# Patient Record
Sex: MALE | Race: WHITE | NOT HISPANIC OR LATINO | Employment: OTHER | URBAN - METROPOLITAN AREA
[De-identification: names, ages, dates, MRNs, and addresses within clinical notes are randomized per-mention and may not be internally consistent; named-entity substitution may affect disease eponyms.]

---

## 2022-08-25 ENCOUNTER — HOSPITAL ENCOUNTER (OUTPATIENT)
Facility: HOSPITAL | Age: 77
Discharge: HOME | End: 2022-08-26
Attending: EMERGENCY MEDICINE | Admitting: INTERNAL MEDICINE
Payer: MEDICARE

## 2022-08-25 DIAGNOSIS — R07.9 CHEST PAIN, UNSPECIFIED TYPE: ICD-10-CM

## 2022-08-25 DIAGNOSIS — R79.89 ELEVATED TROPONIN: ICD-10-CM

## 2022-08-25 DIAGNOSIS — R07.9 CHEST PAIN: Primary | ICD-10-CM

## 2022-08-25 PROBLEM — R73.03 PREDIABETES: Status: ACTIVE | Noted: 2022-08-25

## 2022-08-25 PROBLEM — I21.4 NSTEMI (NON-ST ELEVATED MYOCARDIAL INFARCTION): Status: ACTIVE | Noted: 2022-08-25

## 2022-08-25 LAB
ALBUMIN SERPL BCP-MCNC: 3.1 G/DL (ref 3.5–5.2)
ALP SERPL-CCNC: 54 U/L (ref 55–135)
ALT SERPL W/O P-5'-P-CCNC: 38 U/L (ref 10–44)
ANION GAP SERPL CALC-SCNC: 9 MMOL/L (ref 8–16)
AORTIC ROOT ANNULUS: 3.51 CM
APTT BLDCRRT: 29.5 SEC (ref 21–32)
ASCENDING AORTA: 2.88 CM
AST SERPL-CCNC: 36 U/L (ref 10–40)
AV INDEX (PROSTH): 0.71
AV MEAN GRADIENT: 9 MMHG
AV PEAK GRADIENT: 13 MMHG
AV REGURGITATION PRESSURE HALF TIME: 991.4 MS
AV VALVE AREA: 2.28 CM2
AV VELOCITY RATIO: 0.71
BACTERIA #/AREA URNS HPF: NORMAL /HPF
BASOPHILS # BLD AUTO: 0.02 K/UL (ref 0–0.2)
BASOPHILS # BLD AUTO: 0.02 K/UL (ref 0–0.2)
BASOPHILS NFR BLD: 0.3 % (ref 0–1.9)
BASOPHILS NFR BLD: 0.3 % (ref 0–1.9)
BILIRUB SERPL-MCNC: 1.4 MG/DL (ref 0.1–1)
BILIRUB UR QL STRIP: NEGATIVE
BNP SERPL-MCNC: 107 PG/ML (ref 0–99)
BSA FOR ECHO PROCEDURE: 2.22 M2
BUN SERPL-MCNC: 21 MG/DL (ref 8–23)
CALCIUM SERPL-MCNC: 9.5 MG/DL (ref 8.7–10.5)
CATH EF ESTIMATED: 60 %
CHLORIDE SERPL-SCNC: 99 MMOL/L (ref 95–110)
CK SERPL-CCNC: 205 U/L (ref 20–200)
CLARITY UR: CLEAR
CO2 SERPL-SCNC: 25 MMOL/L (ref 23–29)
COLOR UR: YELLOW
CREAT SERPL-MCNC: 1.2 MG/DL (ref 0.5–1.4)
CTP QC/QA: YES
CV ECHO LV RWT: 0.62 CM
DIFFERENTIAL METHOD: ABNORMAL
DIFFERENTIAL METHOD: ABNORMAL
DOP CALC AO PEAK VEL: 1.78 M/S
DOP CALC AO VTI: 36.7 CM
DOP CALC LVOT AREA: 3.2 CM2
DOP CALC LVOT DIAMETER: 2.03 CM
DOP CALC LVOT PEAK VEL: 1.26 M/S
DOP CALC LVOT STROKE VOLUME: 83.78 CM3
DOP CALC RVOT PEAK VEL: 0.74 M/S
DOP CALC RVOT VTI: 15 CM
DOP CALCLVOT PEAK VEL VTI: 25.9 CM
E WAVE DECELERATION TIME: 148.56 MSEC
E/A RATIO: 1.04
E/E' RATIO: 9.89 M/S
ECHO LV POSTERIOR WALL: 1.37 CM (ref 0.6–1.1)
EJECTION FRACTION: 55 %
EOSINOPHIL # BLD AUTO: 0 K/UL (ref 0–0.5)
EOSINOPHIL # BLD AUTO: 0 K/UL (ref 0–0.5)
EOSINOPHIL NFR BLD: 0.1 % (ref 0–8)
EOSINOPHIL NFR BLD: 0.3 % (ref 0–8)
ERYTHROCYTE [DISTWIDTH] IN BLOOD BY AUTOMATED COUNT: 12.4 % (ref 11.5–14.5)
ERYTHROCYTE [DISTWIDTH] IN BLOOD BY AUTOMATED COUNT: 12.5 % (ref 11.5–14.5)
EST. GFR  (NO RACE VARIABLE): >60 ML/MIN/1.73 M^2
FRACTIONAL SHORTENING: 29 % (ref 28–44)
GLUCOSE SERPL-MCNC: 179 MG/DL (ref 70–110)
GLUCOSE UR QL STRIP: ABNORMAL
HCT VFR BLD AUTO: 38.7 % (ref 40–54)
HCT VFR BLD AUTO: 40.2 % (ref 40–54)
HGB BLD-MCNC: 13.7 G/DL (ref 14–18)
HGB BLD-MCNC: 14.2 G/DL (ref 14–18)
HGB UR QL STRIP: ABNORMAL
HYALINE CASTS #/AREA URNS LPF: 0 /LPF
IMM GRANULOCYTES # BLD AUTO: 0.04 K/UL (ref 0–0.04)
IMM GRANULOCYTES # BLD AUTO: 0.05 K/UL (ref 0–0.04)
IMM GRANULOCYTES NFR BLD AUTO: 0.5 % (ref 0–0.5)
IMM GRANULOCYTES NFR BLD AUTO: 0.6 % (ref 0–0.5)
INR PPP: 1.1 (ref 0.8–1.2)
INTERVENTRICULAR SEPTUM: 1.29 CM (ref 0.6–1.1)
IVC DIAMETER: 1.77 CM
IVRT: 60.89 MSEC
KETONES UR QL STRIP: NEGATIVE
LA MAJOR: 4.93 CM
LA MINOR: 5.09 CM
LA WIDTH: 3.8 CM
LEFT ATRIUM SIZE: 3.97 CM
LEFT ATRIUM VOLUME INDEX: 29.3 ML/M2
LEFT ATRIUM VOLUME: 64.23 CM3
LEFT INTERNAL DIMENSION IN SYSTOLE: 3.14 CM (ref 2.1–4)
LEFT VENTRICLE DIASTOLIC VOLUME INDEX: 40.76 ML/M2
LEFT VENTRICLE DIASTOLIC VOLUME: 89.27 ML
LEFT VENTRICLE MASS INDEX: 103 G/M2
LEFT VENTRICLE SYSTOLIC VOLUME INDEX: 17.9 ML/M2
LEFT VENTRICLE SYSTOLIC VOLUME: 39.25 ML
LEFT VENTRICULAR INTERNAL DIMENSION IN DIASTOLE: 4.43 CM (ref 3.5–6)
LEFT VENTRICULAR MASS: 224.79 G
LEUKOCYTE ESTERASE UR QL STRIP: NEGATIVE
LV LATERAL E/E' RATIO: 11.75 M/S
LV SEPTAL E/E' RATIO: 8.55 M/S
LVOT MG: 3.33 MMHG
LVOT MV: 0.86 CM/S
LYMPHOCYTES # BLD AUTO: 0.5 K/UL (ref 1–4.8)
LYMPHOCYTES # BLD AUTO: 0.7 K/UL (ref 1–4.8)
LYMPHOCYTES NFR BLD: 6.1 % (ref 18–48)
LYMPHOCYTES NFR BLD: 9.2 % (ref 18–48)
MCH RBC QN AUTO: 31.6 PG (ref 27–31)
MCH RBC QN AUTO: 31.8 PG (ref 27–31)
MCHC RBC AUTO-ENTMCNC: 35.3 G/DL (ref 32–36)
MCHC RBC AUTO-ENTMCNC: 35.4 G/DL (ref 32–36)
MCV RBC AUTO: 89 FL (ref 82–98)
MCV RBC AUTO: 90 FL (ref 82–98)
MICROSCOPIC COMMENT: NORMAL
MONOCYTES # BLD AUTO: 0.8 K/UL (ref 0.3–1)
MONOCYTES # BLD AUTO: 0.9 K/UL (ref 0.3–1)
MONOCYTES NFR BLD: 10.8 % (ref 4–15)
MONOCYTES NFR BLD: 12.1 % (ref 4–15)
MV PEAK A VEL: 0.9 M/S
MV PEAK E VEL: 0.94 M/S
NEUTROPHILS # BLD AUTO: 6 K/UL (ref 1.8–7.7)
NEUTROPHILS # BLD AUTO: 6.1 K/UL (ref 1.8–7.7)
NEUTROPHILS NFR BLD: 78.8 % (ref 38–73)
NEUTROPHILS NFR BLD: 80.9 % (ref 38–73)
NITRITE UR QL STRIP: NEGATIVE
NRBC BLD-RTO: 0 /100 WBC
NRBC BLD-RTO: 0 /100 WBC
PH UR STRIP: 6 [PH] (ref 5–8)
PISA AR MAX VEL: 2.84 M/S
PISA MRMAX VEL: 3.06 M/S
PLATELET # BLD AUTO: 121 K/UL (ref 150–450)
PLATELET # BLD AUTO: 127 K/UL (ref 150–450)
PMV BLD AUTO: 8.8 FL (ref 9.2–12.9)
PMV BLD AUTO: 9.8 FL (ref 9.2–12.9)
POCT GLUCOSE: 267 MG/DL (ref 70–110)
POTASSIUM SERPL-SCNC: 4.2 MMOL/L (ref 3.5–5.1)
PROT SERPL-MCNC: 7 G/DL (ref 6–8.4)
PROT UR QL STRIP: ABNORMAL
PROTHROMBIN TIME: 12 SEC (ref 9–12.5)
PV MEAN GRADIENT: 1.51 MMHG
RA MAJOR: 3.85 CM
RA PRESSURE: 3 MMHG
RA WIDTH: 2.8 CM
RBC # BLD AUTO: 4.34 M/UL (ref 4.6–6.2)
RBC # BLD AUTO: 4.47 M/UL (ref 4.6–6.2)
RBC #/AREA URNS HPF: 1 /HPF (ref 0–4)
SARS-COV-2 RDRP RESP QL NAA+PROBE: NEGATIVE
SODIUM SERPL-SCNC: 133 MMOL/L (ref 136–145)
SP GR UR STRIP: 1.01 (ref 1–1.03)
STJ: 2.9 CM
TDI LATERAL: 0.08 M/S
TDI SEPTAL: 0.11 M/S
TDI: 0.1 M/S
TRICUSPID ANNULAR PLANE SYSTOLIC EXCURSION: 2.1 CM
TROPONIN I SERPL DL<=0.01 NG/ML-MCNC: 0.06 NG/ML (ref 0–0.03)
TROPONIN I SERPL DL<=0.01 NG/ML-MCNC: 0.07 NG/ML (ref 0–0.03)
URN SPEC COLLECT METH UR: ABNORMAL
UROBILINOGEN UR STRIP-ACNC: NEGATIVE EU/DL
WBC # BLD AUTO: 7.37 K/UL (ref 3.9–12.7)
WBC # BLD AUTO: 7.79 K/UL (ref 3.9–12.7)
WBC #/AREA URNS HPF: 0 /HPF (ref 0–5)
YEAST URNS QL MICRO: NORMAL

## 2022-08-25 PROCEDURE — 63600175 PHARM REV CODE 636 W HCPCS: Performed by: INTERNAL MEDICINE

## 2022-08-25 PROCEDURE — 99203 OFFICE O/P NEW LOW 30 MIN: CPT | Mod: 25,,, | Performed by: INTERNAL MEDICINE

## 2022-08-25 PROCEDURE — 81000 URINALYSIS NONAUTO W/SCOPE: CPT | Performed by: EMERGENCY MEDICINE

## 2022-08-25 PROCEDURE — 99152 MOD SED SAME PHYS/QHP 5/>YRS: CPT | Mod: ,,, | Performed by: INTERNAL MEDICINE

## 2022-08-25 PROCEDURE — C1769 GUIDE WIRE: HCPCS | Performed by: INTERNAL MEDICINE

## 2022-08-25 PROCEDURE — 99152 PR MOD CONSCIOUS SEDATION, SAME PHYS, 5+ YRS, FIRST 15 MIN: ICD-10-PCS | Mod: ,,, | Performed by: INTERNAL MEDICINE

## 2022-08-25 PROCEDURE — 85025 COMPLETE CBC W/AUTO DIFF WBC: CPT | Performed by: INTERNAL MEDICINE

## 2022-08-25 PROCEDURE — 80053 COMPREHEN METABOLIC PANEL: CPT | Performed by: EMERGENCY MEDICINE

## 2022-08-25 PROCEDURE — 25000003 PHARM REV CODE 250: Performed by: INTERNAL MEDICINE

## 2022-08-25 PROCEDURE — C1894 INTRO/SHEATH, NON-LASER: HCPCS | Performed by: INTERNAL MEDICINE

## 2022-08-25 PROCEDURE — G0378 HOSPITAL OBSERVATION PER HR: HCPCS

## 2022-08-25 PROCEDURE — 99152 MOD SED SAME PHYS/QHP 5/>YRS: CPT | Performed by: INTERNAL MEDICINE

## 2022-08-25 PROCEDURE — 25500020 PHARM REV CODE 255: Performed by: INTERNAL MEDICINE

## 2022-08-25 PROCEDURE — 93010 EKG 12-LEAD: ICD-10-PCS | Mod: ,,, | Performed by: INTERNAL MEDICINE

## 2022-08-25 PROCEDURE — 96365 THER/PROPH/DIAG IV INF INIT: CPT | Mod: 59

## 2022-08-25 PROCEDURE — 27201423 OPTIME MED/SURG SUP & DEVICES STERILE SUPPLY: Performed by: INTERNAL MEDICINE

## 2022-08-25 PROCEDURE — 99285 EMERGENCY DEPT VISIT HI MDM: CPT | Mod: 25

## 2022-08-25 PROCEDURE — 82550 ASSAY OF CK (CPK): CPT | Performed by: EMERGENCY MEDICINE

## 2022-08-25 PROCEDURE — 99203 PR OFFICE/OUTPT VISIT, NEW, LEVL III, 30-44 MIN: ICD-10-PCS | Mod: 25,,, | Performed by: INTERNAL MEDICINE

## 2022-08-25 PROCEDURE — 85730 THROMBOPLASTIN TIME PARTIAL: CPT | Performed by: INTERNAL MEDICINE

## 2022-08-25 PROCEDURE — 93458 L HRT ARTERY/VENTRICLE ANGIO: CPT | Performed by: INTERNAL MEDICINE

## 2022-08-25 PROCEDURE — 96366 THER/PROPH/DIAG IV INF ADDON: CPT | Mod: 59

## 2022-08-25 PROCEDURE — 83880 ASSAY OF NATRIURETIC PEPTIDE: CPT | Performed by: EMERGENCY MEDICINE

## 2022-08-25 PROCEDURE — 96375 TX/PRO/DX INJ NEW DRUG ADDON: CPT | Mod: 59

## 2022-08-25 PROCEDURE — 93458 PR CATH PLACE/CORON ANGIO, IMG SUPER/INTERP,W LEFT HEART VENTRICULOGRAPHY: ICD-10-PCS | Mod: 26,,, | Performed by: INTERNAL MEDICINE

## 2022-08-25 PROCEDURE — 84484 ASSAY OF TROPONIN QUANT: CPT | Performed by: INTERNAL MEDICINE

## 2022-08-25 PROCEDURE — 85025 COMPLETE CBC W/AUTO DIFF WBC: CPT | Mod: 91 | Performed by: EMERGENCY MEDICINE

## 2022-08-25 PROCEDURE — 93010 ELECTROCARDIOGRAM REPORT: CPT | Mod: ,,, | Performed by: INTERNAL MEDICINE

## 2022-08-25 PROCEDURE — 84484 ASSAY OF TROPONIN QUANT: CPT | Mod: 91 | Performed by: EMERGENCY MEDICINE

## 2022-08-25 PROCEDURE — 99153 MOD SED SAME PHYS/QHP EA: CPT | Performed by: INTERNAL MEDICINE

## 2022-08-25 PROCEDURE — 93458 L HRT ARTERY/VENTRICLE ANGIO: CPT | Mod: 26,,, | Performed by: INTERNAL MEDICINE

## 2022-08-25 PROCEDURE — 93005 ELECTROCARDIOGRAM TRACING: CPT

## 2022-08-25 PROCEDURE — 85610 PROTHROMBIN TIME: CPT | Performed by: INTERNAL MEDICINE

## 2022-08-25 PROCEDURE — U0002 COVID-19 LAB TEST NON-CDC: HCPCS | Performed by: EMERGENCY MEDICINE

## 2022-08-25 PROCEDURE — C1887 CATHETER, GUIDING: HCPCS | Performed by: INTERNAL MEDICINE

## 2022-08-25 RX ORDER — KETOCONAZOLE 200 MG/1
TABLET ORAL
COMMUNITY
End: 2022-08-25 | Stop reason: CLARIF

## 2022-08-25 RX ORDER — IBUPROFEN 200 MG
24 TABLET ORAL
Status: DISCONTINUED | OUTPATIENT
Start: 2022-08-25 | End: 2022-08-25 | Stop reason: SDUPTHER

## 2022-08-25 RX ORDER — CLOTRIMAZOLE AND BETAMETHASONE DIPROPIONATE 10; .64 MG/G; MG/G
CREAM TOPICAL
COMMUNITY
Start: 2022-04-04 | End: 2022-08-25 | Stop reason: CLARIF

## 2022-08-25 RX ORDER — IBUPROFEN 200 MG
24 TABLET ORAL
Status: DISCONTINUED | OUTPATIENT
Start: 2022-08-25 | End: 2022-08-26 | Stop reason: HOSPADM

## 2022-08-25 RX ORDER — IBUPROFEN 200 MG
16 TABLET ORAL
Status: DISCONTINUED | OUTPATIENT
Start: 2022-08-25 | End: 2022-08-25 | Stop reason: SDUPTHER

## 2022-08-25 RX ORDER — ATORVASTATIN CALCIUM 40 MG/1
40 TABLET, FILM COATED ORAL NIGHTLY
Status: DISCONTINUED | OUTPATIENT
Start: 2022-08-25 | End: 2022-08-26 | Stop reason: HOSPADM

## 2022-08-25 RX ORDER — IODIXANOL 320 MG/ML
INJECTION, SOLUTION INTRAVASCULAR
Status: DISCONTINUED | OUTPATIENT
Start: 2022-08-25 | End: 2022-08-25

## 2022-08-25 RX ORDER — DIPHENHYDRAMINE HYDROCHLORIDE 50 MG/ML
25 INJECTION INTRAMUSCULAR; INTRAVENOUS ONCE
Status: COMPLETED | OUTPATIENT
Start: 2022-08-25 | End: 2022-08-25

## 2022-08-25 RX ORDER — SODIUM CHLORIDE 9 MG/ML
INJECTION, SOLUTION INTRAVENOUS CONTINUOUS
Status: DISCONTINUED | OUTPATIENT
Start: 2022-08-25 | End: 2022-08-26 | Stop reason: HOSPADM

## 2022-08-25 RX ORDER — FENTANYL CITRATE 50 UG/ML
INJECTION, SOLUTION INTRAMUSCULAR; INTRAVENOUS
Status: DISCONTINUED | OUTPATIENT
Start: 2022-08-25 | End: 2022-08-25

## 2022-08-25 RX ORDER — GABAPENTIN 300 MG/1
CAPSULE ORAL
COMMUNITY
End: 2022-08-25 | Stop reason: CLARIF

## 2022-08-25 RX ORDER — MIDAZOLAM HYDROCHLORIDE 1 MG/ML
INJECTION, SOLUTION INTRAMUSCULAR; INTRAVENOUS
Status: DISCONTINUED | OUTPATIENT
Start: 2022-08-25 | End: 2022-08-25

## 2022-08-25 RX ORDER — ENOXAPARIN SODIUM 100 MG/ML
40 INJECTION SUBCUTANEOUS EVERY 24 HOURS
Status: DISCONTINUED | OUTPATIENT
Start: 2022-08-25 | End: 2022-08-25

## 2022-08-25 RX ORDER — INDOMETHACIN 50 MG/1
CAPSULE ORAL
COMMUNITY
End: 2022-08-25 | Stop reason: CLARIF

## 2022-08-25 RX ORDER — HEPARIN SODIUM 1000 [USP'U]/ML
INJECTION, SOLUTION INTRAVENOUS; SUBCUTANEOUS
Status: DISCONTINUED | OUTPATIENT
Start: 2022-08-25 | End: 2022-08-25

## 2022-08-25 RX ORDER — ACETAMINOPHEN 325 MG/1
650 TABLET ORAL EVERY 4 HOURS PRN
Status: DISCONTINUED | OUTPATIENT
Start: 2022-08-25 | End: 2022-08-26 | Stop reason: HOSPADM

## 2022-08-25 RX ORDER — METFORMIN HYDROCHLORIDE 500 MG/1
500 TABLET ORAL 2 TIMES DAILY
Status: ON HOLD | COMMUNITY
Start: 2022-07-20 | End: 2022-08-26 | Stop reason: SDUPTHER

## 2022-08-25 RX ORDER — ONDANSETRON 8 MG/1
8 TABLET, ORALLY DISINTEGRATING ORAL EVERY 8 HOURS PRN
Status: DISCONTINUED | OUTPATIENT
Start: 2022-08-25 | End: 2022-08-26 | Stop reason: HOSPADM

## 2022-08-25 RX ORDER — GLUCAGON 1 MG
1 KIT INJECTION
Status: DISCONTINUED | OUTPATIENT
Start: 2022-08-25 | End: 2022-08-25 | Stop reason: SDUPTHER

## 2022-08-25 RX ORDER — INSULIN ASPART 100 [IU]/ML
0-5 INJECTION, SOLUTION INTRAVENOUS; SUBCUTANEOUS
Status: DISCONTINUED | OUTPATIENT
Start: 2022-08-25 | End: 2022-08-26 | Stop reason: HOSPADM

## 2022-08-25 RX ORDER — FAMOTIDINE 20 MG/1
20 TABLET, FILM COATED ORAL 2 TIMES DAILY
Status: COMPLETED | OUTPATIENT
Start: 2022-08-25 | End: 2022-08-25

## 2022-08-25 RX ORDER — NITROGLYCERIN 80 MG/1
1 PATCH TRANSDERMAL DAILY
Status: DISCONTINUED | OUTPATIENT
Start: 2022-08-25 | End: 2022-08-26 | Stop reason: HOSPADM

## 2022-08-25 RX ORDER — ONDANSETRON 4 MG/1
8 TABLET, ORALLY DISINTEGRATING ORAL EVERY 8 HOURS PRN
COMMUNITY
Start: 2022-08-24

## 2022-08-25 RX ORDER — SODIUM CHLORIDE 0.9 % (FLUSH) 0.9 %
10 SYRINGE (ML) INJECTION EVERY 12 HOURS PRN
Status: DISCONTINUED | OUTPATIENT
Start: 2022-08-25 | End: 2022-08-26 | Stop reason: HOSPADM

## 2022-08-25 RX ORDER — NALOXONE HCL 0.4 MG/ML
0.02 VIAL (ML) INJECTION
Status: DISCONTINUED | OUTPATIENT
Start: 2022-08-25 | End: 2022-08-26 | Stop reason: HOSPADM

## 2022-08-25 RX ORDER — GLUCAGON 1 MG
1 KIT INJECTION
Status: DISCONTINUED | OUTPATIENT
Start: 2022-08-25 | End: 2022-08-26 | Stop reason: HOSPADM

## 2022-08-25 RX ORDER — METOPROLOL SUCCINATE 25 MG/1
25 TABLET, EXTENDED RELEASE ORAL DAILY
Status: DISCONTINUED | OUTPATIENT
Start: 2022-08-26 | End: 2022-08-26 | Stop reason: HOSPADM

## 2022-08-25 RX ORDER — ATORVASTATIN CALCIUM 20 MG/1
TABLET, FILM COATED ORAL
COMMUNITY
End: 2022-08-25 | Stop reason: CLARIF

## 2022-08-25 RX ORDER — HEPARIN SODIUM,PORCINE/D5W 25000/250
0-40 INTRAVENOUS SOLUTION INTRAVENOUS CONTINUOUS
Status: DISCONTINUED | OUTPATIENT
Start: 2022-08-25 | End: 2022-08-25

## 2022-08-25 RX ORDER — NAPROXEN SODIUM 220 MG/1
81 TABLET, FILM COATED ORAL DAILY
Status: DISCONTINUED | OUTPATIENT
Start: 2022-08-25 | End: 2022-08-26 | Stop reason: HOSPADM

## 2022-08-25 RX ORDER — LIDOCAINE HYDROCHLORIDE 20 MG/ML
INJECTION, SOLUTION EPIDURAL; INFILTRATION; INTRACAUDAL; PERINEURAL
Status: DISCONTINUED | OUTPATIENT
Start: 2022-08-25 | End: 2022-08-25

## 2022-08-25 RX ORDER — MELOXICAM 7.5 MG/1
TABLET ORAL
COMMUNITY
End: 2022-08-25 | Stop reason: CLARIF

## 2022-08-25 RX ORDER — NITROGLYCERIN 5 MG/ML
INJECTION, SOLUTION INTRAVENOUS
Status: DISCONTINUED | OUTPATIENT
Start: 2022-08-25 | End: 2022-08-25

## 2022-08-25 RX ORDER — IBUPROFEN 200 MG
16 TABLET ORAL
Status: DISCONTINUED | OUTPATIENT
Start: 2022-08-25 | End: 2022-08-26 | Stop reason: HOSPADM

## 2022-08-25 RX ORDER — SODIUM CHLORIDE 9 MG/ML
INJECTION, SOLUTION INTRAVENOUS CONTINUOUS
Status: ACTIVE | OUTPATIENT
Start: 2022-08-25 | End: 2022-08-25

## 2022-08-25 RX ORDER — POLYETHYLENE GLYCOL 3350 17 G/17G
17 POWDER, FOR SOLUTION ORAL DAILY
Status: DISCONTINUED | OUTPATIENT
Start: 2022-08-25 | End: 2022-08-26 | Stop reason: HOSPADM

## 2022-08-25 RX ADMIN — NITROGLYCERIN 1 PATCH: 0.4 PATCH TRANSDERMAL at 01:08

## 2022-08-25 RX ADMIN — ATORVASTATIN CALCIUM 40 MG: 40 TABLET, FILM COATED ORAL at 09:08

## 2022-08-25 RX ADMIN — FAMOTIDINE 20 MG: 20 TABLET ORAL at 12:08

## 2022-08-25 RX ADMIN — METHYLPREDNISOLONE SODIUM SUCCINATE 80 MG: 40 INJECTION, POWDER, FOR SOLUTION INTRAMUSCULAR; INTRAVENOUS at 12:08

## 2022-08-25 RX ADMIN — SODIUM CHLORIDE: 9 INJECTION, SOLUTION INTRAVENOUS at 09:08

## 2022-08-25 RX ADMIN — HEPARIN SODIUM 12 UNITS/KG/HR: 10000 INJECTION, SOLUTION INTRAVENOUS at 01:08

## 2022-08-25 RX ADMIN — DIPHENHYDRAMINE HYDROCHLORIDE 25 MG: 50 INJECTION, SOLUTION INTRAMUSCULAR; INTRAVENOUS at 12:08

## 2022-08-25 RX ADMIN — ASPIRIN 81 MG CHEWABLE TABLET 81 MG: 81 TABLET CHEWABLE at 11:08

## 2022-08-25 NOTE — INTERVAL H&P NOTE
The patient has been examined and the H&P has been reviewed:    I concur with the findings and no changes have occurred since H&P was written.    Anesthesia/Surgery risks, benefits and alternative options discussed and understood by patient/family.          Active Hospital Problems    Diagnosis  POA    NSTEMI (non-ST elevated myocardial infarction) [I21.4]  Unknown    Prediabetes [R73.03]  Unknown      Resolved Hospital Problems   No resolved problems to display.

## 2022-08-25 NOTE — BRIEF OP NOTE
[facility] - [facility] Lab (Hospital)  Brief Operative Note  Cardiology    SUMMARY     Surgery Date: 8/25/2022     Surgeon(s) and Role:     * Jacky Menard MD - Primary    Assisting Surgeon: None    Pre-op Diagnosis:  Chest pain, unspecified type [R07.9]    Post-op Diagnosis: Post-Op Diagnosis Codes:     * Chest pain, unspecified type [R07.9]    Procedure Performed:     Procedure(s) (LRB):  CATHETERIZATION, HEART, LEFT (Left)    Technical Procedures Used:   Codominant   Left main large,   LAD mild diffuse, moderate size normal D1   Left circ is large, OM1 moderate size, OM2 gives a lateral branch that itself gives another branch with ostial 80% stenosis small vessel however , LPL moderate   RCA has early take off of RV marginal , RCA is moderate , with patent PDA     lvedp normal , no gradient   lvef 55%   Dc heparin   cw asa , statin, add bb       Estimated Blood Loss: * No values recorded between 8/25/2022  3:51 PM and 8/25/2022  4:26 PM *         Specimens:   Specimen (24h ago, onward)            None

## 2022-08-25 NOTE — Clinical Note
The catheter was repositioned into the ostium   left main  ostium   right coronary artery. Hemodynamics were performed.  An angiography was performed of the left coronary arteries and right coronary arteries. Multiple views were taken.

## 2022-08-25 NOTE — H&P (VIEW-ONLY)
[facility] - Emergency Dept.  Cardiology  Consult Note    Patient Name: Shahram London  MRN: [id number]  Admission Date: 8/25/2022  Hospital Length of Stay: 0 days  Code Status: Full Code   Attending Provider: Eren Stapleton MD   Consulting Provider: Jacky Menard MD  Primary Care Physician: Jimmy Montelongo MD  Principal Problem:<principal problem not specified>    Patient information was obtained from patient, past medical records and ER records.     Inpatient consult to Cardiology  Consult performed by: Jacky Menard MD  Consult ordered by: Amy Butt MD  Reason for consult: NSTEMI        Subjective:     Chief Complaint:  NSTEMI     HPI:   76 yo male with h/o prediatbetes, htn, hld, ex smoker for very short time ?   Comes in started to have chills but no fever 4 days ago   Then started to have chest pain radiating to the back today, trop mildly elevated, still having mid sternal chest pain   His ekg no LUDIN or depression   Echo prelim normal EF , no AS   Very remote h/o allergy to contrast, but no anaphylaxis   Discussed lhc/pci      No past medical history on file.    No past surgical history on file.    Review of patient's allergies indicates:   Allergen Reactions    Iodinated contrast media Other (See Comments)     UNKNOWN       No current facility-administered medications on file prior to encounter.     Current Outpatient Medications on File Prior to Encounter   Medication Sig    metFORMIN (GLUCOPHAGE) 500 MG tablet Take 500 mg by mouth 2 (two) times daily.    ondansetron (ZOFRAN-ODT) 4 MG TbDL Take 8 mg by mouth every 8 (eight) hours as needed.    [DISCONTINUED] atorvastatin (LIPITOR) 20 MG tablet atorvastatin 20 mg tablet    [DISCONTINUED] clotrimazole-betamethasone 1-0.05% (LOTRISONE) cream SMARTSIG:Topical Morning-Evening    [DISCONTINUED] gabapentin (NEURONTIN) 300 MG capsule gabapentin 300 mg capsule    [DISCONTINUED] indomethacin (INDOCIN) 50 MG capsule indomethacin 50 mg  capsule    [DISCONTINUED] ketoconazole (NIZORAL) 200 mg Tab ketoconazole 200 mg tablet   Take 1 tablet every day by oral route.    [DISCONTINUED] meloxicam (MOBIC) 7.5 MG tablet meloxicam 7.5 mg tablet     Family History    None       Tobacco Use    Smoking status: Not on file    Smokeless tobacco: Not on file   Substance and Sexual Activity    Alcohol use: Not on file    Drug use: Not on file    Sexual activity: Not on file     Review of Systems   Constitutional: Negative for fever and malaise/fatigue.   HENT:  Negative for sore throat.    Eyes:  Negative for blurred vision.   Cardiovascular:  Positive for chest pain. Negative for claudication, cyanosis, dyspnea on exertion, irregular heartbeat, leg swelling, near-syncope, orthopnea, palpitations, paroxysmal nocturnal dyspnea and syncope.   Respiratory:  Negative for cough and hemoptysis.    Hematologic/Lymphatic: Negative for bleeding problem.   Skin:  Negative for rash.   Musculoskeletal:  Negative for falls.   Gastrointestinal:  Negative for abdominal pain.   Genitourinary: Negative.    Neurological: Negative.    Psychiatric/Behavioral:  Negative for altered mental status and substance abuse.    Objective:     Vital Signs (Most Recent):  Temp: 99.6 °F (37.6 °C) (08/25/22 1000)  Pulse: 73 (08/25/22 1000)  Resp: 20 (08/25/22 1000)  BP: (!) 142/76 (08/25/22 1000)  SpO2: 96 % (08/25/22 1000)   Vital Signs (24h Range):  Temp:  [98.1 °F (36.7 °C)-99.6 °F (37.6 °C)] 99.6 °F (37.6 °C)  Pulse:  [73-90] 73  Resp:  [18-20] 20  SpO2:  [95 %-97 %] 96 %  BP: (142-153)/(74-83) 142/76     Weight: 97.4 kg (214 lb 11.7 oz)  Body mass index is 29.12 kg/m².    SpO2: 96 %  O2 Device (Oxygen Therapy): room air    No intake or output data in the 24 hours ending 08/25/22 1212    Lines/Drains/Airways       Peripheral Intravenous Line  Duration                  Peripheral IV - Single Lumen 08/25/22 0908 20 G Left Upper Arm <1 day                    Physical Exam  Vitals reviewed.    Constitutional:       Appearance: He is well-developed.   HENT:      Head: Normocephalic and atraumatic.   Eyes:      General: No scleral icterus.     Conjunctiva/sclera: Conjunctivae normal.   Cardiovascular:      Rate and Rhythm: Normal rate and regular rhythm.      Pulses: Intact distal pulses.      Heart sounds: Normal heart sounds. No murmur heard.  Pulmonary:      Effort: No respiratory distress.      Breath sounds: No wheezing or rales.   Chest:      Chest wall: No tenderness.   Abdominal:      General: Bowel sounds are normal. There is no distension.      Palpations: Abdomen is soft.      Tenderness: There is no guarding.   Musculoskeletal:         General: Normal range of motion.      Cervical back: Normal range of motion and neck supple.   Skin:     General: Skin is warm.   Neurological:      Mental Status: He is alert and oriented to person, place, and time.       Significant Labs:   Recent Lab Results         08/25/22  1047   08/25/22  0933   08/25/22  0905   08/25/22  0852        Albumin     3.1         Alkaline Phosphatase     54         ALT     38         Anion Gap     9         Appearance, UA       Clear       AST     36         Bacteria, UA       None       Baso #     0.02         Basophil %     0.3         Bilirubin (UA)       Negative       BILIRUBIN TOTAL     1.4  Comment: For infants and newborns, interpretation of results should be based  on gestational age, weight and in agreement with clinical  observations.    Premature Infant recommended reference ranges:  Up to 24 hours.............<8.0 mg/dL  Up to 48 hours............<12.0 mg/dL  3-5 days..................<15.0 mg/dL  6-29 days.................<15.0 mg/dL           BNP     107  Comment: Values of less than 100 pg/ml are consistent with non-CHF populations.         BUN     21         Calcium     9.5         Chloride     99         CO2     25         Color, UA       Yellow       CPK     205         Creatinine     1.2         Differential  Method     Automated         eGFR     >60         Eos #     0.0         Eosinophil %     0.1         Glucose     179         Glucose, UA       3+       Gran # (ANC)     6.0         Gran %     80.9         Hematocrit     40.2         Hemoglobin     14.2         Hyaline Casts, UA       0       Immature Grans (Abs)     0.04  Comment: Mild elevation in immature granulocytes is non specific and   can be seen in a variety of conditions including stress response,   acute inflammation, trauma and pregnancy. Correlation with other   laboratory and clinical findings is essential.           Immature Granulocytes     0.5         Ketones, UA       Negative       Leukocytes, UA       Negative       Lymph #     0.5         Lymph %     6.1         MCH     31.8         MCHC     35.3         MCV     90         Microscopic Comment       SEE COMMENT  Comment: Other formed elements not mentioned in the report are not   present in the microscopic examination.          Mono #     0.9         Mono %     12.1         MPV     8.8         NITRITE UA       Negative       nRBC     0         Occult Blood UA       1+       pH, UA       6.0       Platelets     127         Potassium     4.2         PROTEIN TOTAL     7.0         Protein, UA       1+  Comment: Recommend a 24 hour urine protein or a urine   protein/creatinine ratio if globulin induced proteinuria is  clinically suspected.          Acceptable   Yes           RBC     4.47         RBC, UA       1       RDW     12.5         SARS-CoV-2 RNA, Amplification, Qual   Negative           Sodium     133         Specific [place], UA       1.010       Specimen UA       Urine, Clean Catch       Troponin I 0.060  Comment: The reference interval for Troponin I represents the 99th percentile   cutoff   for our facility and is consistent with 3rd generation assay   performance.       0.072  Comment: The reference interval for Troponin I represents the 99th percentile   cutoff   for our  facility and is consistent with 3rd generation assay   performance.           UROBILINOGEN UA       Negative       WBC, UA       0       WBC     7.37         Yeast, UA       None               Significant Imaging: EKG: NSR, NON SPECIFIC    Assessment and Plan:     Prediabetes  AS PER HOSPITAL MEDICINE     NSTEMI (non-ST elevated myocardial infarction)  IV HEPARIN   CW ASA, STATIN   NITRO PATCH   CONTRAST ALLERGY PREMEDICATION   LHC/PCI , DISCUSSED WITH WIFE , SON AND THE PATIENT   WILL PLAN FOR LHC TODAY   AGREEABLE , CONTINUES TO HAVE CHEST PAIN   COVID NEGATIVE   CXR REVIEWED        VTE Risk Mitigation (From admission, onward)         Ordered     enoxaparin injection 40 mg  Daily         08/25/22 1026     IP VTE HIGH RISK PATIENT  Once         08/25/22 1026     Place sequential compression device  Until discontinued         08/25/22 1026                Thank you for your consult. I will follow-up with patient. Please contact us if you have any additional questions.    Jacky Menard MD  Cardiology   [facility] - Emergency Dept.

## 2022-08-25 NOTE — SUBJECTIVE & OBJECTIVE
Interval History:     Review of Systems   Constitutional:  Positive for activity change.   Eyes: Negative.    Respiratory: Negative.     Cardiovascular:  Positive for chest pain.   Gastrointestinal: Negative.    Endocrine: Negative.    Genitourinary: Negative.    Musculoskeletal: Negative.    Allergic/Immunologic: Negative.    Neurological: Negative.    Hematological: Negative.    Psychiatric/Behavioral: Negative.     Objective:     Vital Signs (Most Recent):  Temp: 97.8 °F (36.6 °C) (08/25/22 1630)  Pulse: 66 (08/25/22 1730)  Resp: (!) 30 (08/25/22 1730)  BP: (!) 91/47 (08/25/22 1730)  SpO2: (!) 93 % (08/25/22 1730)   Vital Signs (24h Range):  Temp:  [97.8 °F (36.6 °C)-99.6 °F (37.6 °C)] 97.8 °F (36.6 °C)  Pulse:  [63-90] 66  Resp:  [18-30] 30  SpO2:  [91 %-97 %] 93 %  BP: ()/(47-83) 91/47     Weight: 97.1 kg (214 lb)  Body mass index is 29.02 kg/m².  No intake or output data in the 24 hours ending 08/25/22 1743   Physical Exam  Constitutional:       Appearance: Normal appearance. He is normal weight.   HENT:      Head: Normocephalic and atraumatic.      Nose: Nose normal.      Mouth/Throat:      Mouth: Mucous membranes are moist.   Eyes:      Extraocular Movements: Extraocular movements intact.      Pupils: Pupils are equal, round, and reactive to light.   Cardiovascular:      Rate and Rhythm: Normal rate and regular rhythm.   Pulmonary:      Effort: Pulmonary effort is normal.      Breath sounds: Normal breath sounds.   Abdominal:      General: Abdomen is flat. Bowel sounds are normal.   Musculoskeletal:         General: Normal range of motion.   Skin:     General: Skin is warm.   Neurological:      General: No focal deficit present.      Mental Status: He is alert.   Psychiatric:         Mood and Affect: Mood normal.       Significant Labs: All pertinent labs within the past 24 hours have been reviewed.    Significant Imaging: I have reviewed all pertinent imaging results/findings within the past 24  hours.

## 2022-08-25 NOTE — SUBJECTIVE & OBJECTIVE
No past medical history on file.    No past surgical history on file.    Review of patient's allergies indicates:   Allergen Reactions    Iodinated contrast media Other (See Comments)     UNKNOWN       No current facility-administered medications on file prior to encounter.     Current Outpatient Medications on File Prior to Encounter   Medication Sig    metFORMIN (GLUCOPHAGE) 500 MG tablet Take 500 mg by mouth 2 (two) times daily.    ondansetron (ZOFRAN-ODT) 4 MG TbDL Take 8 mg by mouth every 8 (eight) hours as needed.    [DISCONTINUED] atorvastatin (LIPITOR) 20 MG tablet atorvastatin 20 mg tablet    [DISCONTINUED] clotrimazole-betamethasone 1-0.05% (LOTRISONE) cream SMARTSIG:Topical Morning-Evening    [DISCONTINUED] gabapentin (NEURONTIN) 300 MG capsule gabapentin 300 mg capsule    [DISCONTINUED] indomethacin (INDOCIN) 50 MG capsule indomethacin 50 mg capsule    [DISCONTINUED] ketoconazole (NIZORAL) 200 mg Tab ketoconazole 200 mg tablet   Take 1 tablet every day by oral route.    [DISCONTINUED] meloxicam (MOBIC) 7.5 MG tablet meloxicam 7.5 mg tablet     Family History    None       Tobacco Use    Smoking status: Not on file    Smokeless tobacco: Not on file   Substance and Sexual Activity    Alcohol use: Not on file    Drug use: Not on file    Sexual activity: Not on file     Review of Systems   Constitutional: Negative for fever and malaise/fatigue.   HENT:  Negative for sore throat.    Eyes:  Negative for blurred vision.   Cardiovascular:  Positive for chest pain. Negative for claudication, cyanosis, dyspnea on exertion, irregular heartbeat, leg swelling, near-syncope, orthopnea, palpitations, paroxysmal nocturnal dyspnea and syncope.   Respiratory:  Negative for cough and hemoptysis.    Hematologic/Lymphatic: Negative for bleeding problem.   Skin:  Negative for rash.   Musculoskeletal:  Negative for falls.   Gastrointestinal:  Negative for abdominal pain.   Genitourinary: Negative.    Neurological: Negative.     Psychiatric/Behavioral:  Negative for altered mental status and substance abuse.    Objective:     Vital Signs (Most Recent):  Temp: 99.6 °F (37.6 °C) (08/25/22 1000)  Pulse: 73 (08/25/22 1000)  Resp: 20 (08/25/22 1000)  BP: (!) 142/76 (08/25/22 1000)  SpO2: 96 % (08/25/22 1000)   Vital Signs (24h Range):  Temp:  [98.1 °F (36.7 °C)-99.6 °F (37.6 °C)] 99.6 °F (37.6 °C)  Pulse:  [73-90] 73  Resp:  [18-20] 20  SpO2:  [95 %-97 %] 96 %  BP: (142-153)/(74-83) 142/76     Weight: 97.4 kg (214 lb 11.7 oz)  Body mass index is 29.12 kg/m².    SpO2: 96 %  O2 Device (Oxygen Therapy): room air    No intake or output data in the 24 hours ending 08/25/22 1212    Lines/Drains/Airways       Peripheral Intravenous Line  Duration                  Peripheral IV - Single Lumen 08/25/22 0908 20 G Left Upper Arm <1 day                    Physical Exam  Vitals reviewed.   Constitutional:       Appearance: He is well-developed.   HENT:      Head: Normocephalic and atraumatic.   Eyes:      General: No scleral icterus.     Conjunctiva/sclera: Conjunctivae normal.   Cardiovascular:      Rate and Rhythm: Normal rate and regular rhythm.      Pulses: Intact distal pulses.      Heart sounds: Normal heart sounds. No murmur heard.  Pulmonary:      Effort: No respiratory distress.      Breath sounds: No wheezing or rales.   Chest:      Chest wall: No tenderness.   Abdominal:      General: Bowel sounds are normal. There is no distension.      Palpations: Abdomen is soft.      Tenderness: There is no guarding.   Musculoskeletal:         General: Normal range of motion.      Cervical back: Normal range of motion and neck supple.   Skin:     General: Skin is warm.   Neurological:      Mental Status: He is alert and oriented to person, place, and time.       Significant Labs:   Recent Lab Results         08/25/22  1047   08/25/22  0933   08/25/22  0905   08/25/22  0852        Albumin     3.1         Alkaline Phosphatase     54         ALT     38          Anion Gap     9         Appearance, UA       Clear       AST     36         Bacteria, UA       None       Baso #     0.02         Basophil %     0.3         Bilirubin (UA)       Negative       BILIRUBIN TOTAL     1.4  Comment: For infants and newborns, interpretation of results should be based  on gestational age, weight and in agreement with clinical  observations.    Premature Infant recommended reference ranges:  Up to 24 hours.............<8.0 mg/dL  Up to 48 hours............<12.0 mg/dL  3-5 days..................<15.0 mg/dL  6-29 days.................<15.0 mg/dL           BNP     107  Comment: Values of less than 100 pg/ml are consistent with non-CHF populations.         BUN     21         Calcium     9.5         Chloride     99         CO2     25         Color, UA       Yellow       CPK     205         Creatinine     1.2         Differential Method     Automated         eGFR     >60         Eos #     0.0         Eosinophil %     0.1         Glucose     179         Glucose, UA       3+       Gran # (ANC)     6.0         Gran %     80.9         Hematocrit     40.2         Hemoglobin     14.2         Hyaline Casts, UA       0       Immature Grans (Abs)     0.04  Comment: Mild elevation in immature granulocytes is non specific and   can be seen in a variety of conditions including stress response,   acute inflammation, trauma and pregnancy. Correlation with other   laboratory and clinical findings is essential.           Immature Granulocytes     0.5         Ketones, UA       Negative       Leukocytes, UA       Negative       Lymph #     0.5         Lymph %     6.1         MCH     31.8         MCHC     35.3         MCV     90         Microscopic Comment       SEE COMMENT  Comment: Other formed elements not mentioned in the report are not   present in the microscopic examination.          Mono #     0.9         Mono %     12.1         MPV     8.8         NITRITE UA       Negative       nRBC     0         Occult  Blood UA       1+       pH, UA       6.0       Platelets     127         Potassium     4.2         PROTEIN TOTAL     7.0         Protein, UA       1+  Comment: Recommend a 24 hour urine protein or a urine   protein/creatinine ratio if globulin induced proteinuria is  clinically suspected.          Acceptable   Yes           RBC     4.47         RBC, UA       1       RDW     12.5         SARS-CoV-2 RNA, Amplification, Qual   Negative           Sodium     133         Specific [place], UA       1.010       Specimen UA       Urine, Clean Catch       Troponin I 0.060  Comment: The reference interval for Troponin I represents the 99th percentile   cutoff   for our facility and is consistent with 3rd generation assay   performance.       0.072  Comment: The reference interval for Troponin I represents the 99th percentile   cutoff   for our facility and is consistent with 3rd generation assay   performance.           UROBILINOGEN UA       Negative       WBC, UA       0       WBC     7.37         Yeast, UA       None               Significant Imaging: EKG: NSR, NON SPECIFIC

## 2022-08-25 NOTE — H&P
[facility]  History & Physical    Patient Name: Shahram London  MRN: [id number]  Patient Class: OP- Observation  Admission Date: 8/25/2022  Attending Physician: Amy Butt MD   Primary Care Provider: Jimmy Montelongo MD         Patient information was obtained from patient and ER records.     Subjective:     Principal Problem:NSTEMI (non-ST elevated myocardial infarction)    Chief Complaint:   Chief Complaint   Patient presents with    Chest Pain     L side CP radiating to back worsens with deep inspiration started this am    Generalized Body Aches     Body aches and fever since Saturday        HPI:  Shahram London is a 77 y.o. male patient who presents to the Emergency Department for L-sided CP radiating to the back which onset gradually this morning    Troponin -0.072 , CXR  no acute process, ekg -nsr , incomplete RBBB     Admitted with NSTEMI -taken to [facility] today   Past Medical History:-unable to obtain at this time.     Past Surgical History: unable to obtain at this time       Family History:reviewed   Interval History:     Review of Systems   Constitutional:  Positive for activity change.   Eyes: Negative.    Respiratory: Negative.     Cardiovascular:  Positive for chest pain.   Gastrointestinal: Negative.    Endocrine: Negative.    Genitourinary: Negative.    Musculoskeletal: Negative.    Allergic/Immunologic: Negative.    Neurological: Negative.    Hematological: Negative.    Psychiatric/Behavioral: Negative.     Objective:     Vital Signs (Most Recent):  Temp: 97.8 °F (36.6 °C) (08/25/22 1630)  Pulse: 66 (08/25/22 1730)  Resp: (!) 30 (08/25/22 1730)  BP: (!) 91/47 (08/25/22 1730)  SpO2: (!) 93 % (08/25/22 1730)   Vital Signs (24h Range):  Temp:  [97.8 °F (36.6 °C)-99.6 °F (37.6 °C)] 97.8 °F (36.6 °C)  Pulse:  [63-90] 66  Resp:  [18-30] 30  SpO2:  [91 %-97 %] 93 %  BP: ()/(47-83) 91/47     Weight: 97.1 kg (214 lb)  Body mass index is 29.02  kg/m².  No intake or output data in the 24 hours ending 08/25/22 3733   Physical Exam  Constitutional:       Appearance: Normal appearance. He is normal weight.   HENT:      Head: Normocephalic and atraumatic.      Nose: Nose normal.      Mouth/Throat:      Mouth: Mucous membranes are moist.   Eyes:      Extraocular Movements: Extraocular movements intact.      Pupils: Pupils are equal, round, and reactive to light.   Cardiovascular:      Rate and Rhythm: Normal rate and regular rhythm.   Pulmonary:      Effort: Pulmonary effort is normal.      Breath sounds: Normal breath sounds.   Abdominal:      General: Abdomen is flat. Bowel sounds are normal.   Musculoskeletal:         General: Normal range of motion.   Skin:     General: Skin is warm.   Neurological:      General: No focal deficit present.      Mental Status: He is alert.   Psychiatric:         Mood and Affect: Mood normal.       Significant Labs: All pertinent labs within the past 24 hours have been reviewed.    Significant Imaging: I have reviewed all pertinent imaging results/findings within the past 24 hours.    Assessment/Plan:     * NSTEMI (non-ST elevated myocardial infarction)  ADMIT to observation <2 MN    NSTEMI    BB, ASA, STATIN, hep    Morphine/Nitro    supplemental oxygen   lipid profile    Cardiology consulted       VTE Risk Mitigation (From admission, onward)           Ordered     heparin (porcine) injection  As needed (PRN)         08/25/22 1555     IP VTE HIGH RISK PATIENT  Once         08/25/22 1026     Place sequential compression device  Until discontinued         08/25/22 1026                       Amy Butt MD  Department of Hospital Medicine   [facility] - Cath Lab ([facility])

## 2022-08-25 NOTE — NURSING TRANSFER
Nursing Transfer Note      8/25/2022     Reason patient is being transferred:post cath recovery     Transfer To: 246    Transfer via bed    Transfer with cardiac monitoring    Transported by [facility]    Medicines sent: none    Any special needs or follow-up needed: none    Chart send with patient: Yes    Notified: son    Patient reassessed at: TRANSFERRED TO ROOM. TRANSFERRED TO BED.  TELEMETRY MONITER ON.  IV FLUIDS ON PUMP AT PRESCRIBED RATE.  PROCEDURAL ACCESS SITE WITHOUT BLEEDING OR HEMATOMA.  DRESSING DRY AND INTACT.  CARE HANDED OFF TO Bessy Berry.......... (date, time)    Upon arrival to floor: cardiac monitor applied, patient oriented to room, call bell in reach and bed in lowest position

## 2022-08-25 NOTE — HPI
76 yo male with h/o prediatbetes, htn, hld, ex smoker for very short time ?   Comes in started to have chills but no fever 4 days ago   Then started to have chest pain radiating to the back today, trop mildly elevated, still having mid sternal chest pain   His ekg no LUDIN or depression   Echo prelim normal EF , no AS   Very remote h/o allergy to contrast, but no anaphylaxis   Discussed lhc/pci

## 2022-08-25 NOTE — HPI
ShahramBryce London is a 77 y.o. male patient who presents to the Emergency Department for L-sided CP radiating to the back which onset gradually this morning    Troponin -0.072 , CXR  no acute process, ekg -nsr , incomplete RBBB     Admitted with NSTEMI -taken to [facility] today

## 2022-08-25 NOTE — ED PROVIDER NOTES
SCRIBE #1 NOTE: I, Zahira Wolfe, am scribing for, and in the presence of, Eren Stapleton MD. I have scribed the entire note.      History      Chief Complaint   Patient presents with    Chest Pain     L side CP radiating to back worsens with deep inspiration started this am    Generalized Body Aches     Body aches and fever since Saturday       Review of patient's allergies indicates:   Allergen Reactions    Iodinated contrast media         HPI   HPI    8/25/2022, 8:35 AM   History obtained from the patient      History of Present Illness: Shahram London is a 77 y.o. male patient who presents to the Emergency Department for L-sided CP radiating to the back which onset gradually this morning. Symptoms are constant and moderate in severity. No mitigating or exacerbating factors reported. Associated sxs include a subjective fever, generalized myalgias, and back pain radiating from the chest. The pt reports that his subjective fever and generalized myalgias onset 5 days ago. Patient denies any N/V/D, abdominal pain, dysuria, SOB, weakness, dizziness, and all other sxs at this time. No prior Tx reported. No further complaints or concerns at this time.         Arrival mode: Personal vehicle    PCP: Jimmy Montelongo MD       Past Medical History:  No past medical history on file.    Past Surgical History:  No past surgical history on file.      Family History:  No family history on file.    Social History:  Social History     Tobacco Use    Smoking status: Not on file    Smokeless tobacco: Not on file   Substance and Sexual Activity    Alcohol use: Not on file    Drug use: Not on file    Sexual activity: Not on file       ROS   Review of Systems   Constitutional: Positive for fever (subjective).   HENT: Negative for sore throat.    Respiratory: Negative for shortness of breath.    Cardiovascular: Positive for chest pain (L-sided and radiating to the back).   Gastrointestinal: Negative for abdominal  pain, diarrhea, nausea and vomiting.   Genitourinary: Negative for dysuria.   Musculoskeletal: Positive for back pain (radiating from the chest) and myalgias (generalized).   Skin: Negative for rash.   Neurological: Negative for dizziness and weakness.   Hematological: Does not bruise/bleed easily.   All other systems reviewed and are negative.    Physical Exam      Initial Vitals [08/25/22 0827]   BP Pulse Resp Temp SpO2   (!) 149/83 90 18 98.1 °F (36.7 °C) 97 %      MAP       --          Physical Exam  Nursing Notes and Vital Signs Reviewed.  Constitutional: Patient is in no acute distress. Well-developed and well-nourished.  Head: Atraumatic. Normocephalic.  Eyes: PERRL. EOM intact. Conjunctivae are not pale. No scleral icterus.  ENT: Mucous membranes are moist. Oropharynx is clear and symmetric.    Neck: Supple. Full ROM. No lymphadenopathy.  Cardiovascular: Regular rate. Regular rhythm. No murmurs, rubs, or gallops. Distal pulses are 2+ and symmetric.  Pulmonary/Chest: No respiratory distress. Clear to auscultation bilaterally. No wheezing or rales.  Abdominal: Soft and non-distended.  There is no tenderness.  No rebound, guarding, or rigidity.   Musculoskeletal: Moves all extremities. No obvious deformities. No edema.  Skin: Warm and dry.  Neurological:  Alert, awake, and appropriate.  Normal speech.  No acute focal neurological deficits are appreciated.  Psychiatric: Normal affect. Good eye contact. Appropriate in content.    ED Course    Procedures  ED Vital Signs:  Vitals:    08/25/22 0827 08/25/22 0855 08/25/22 0900 08/25/22 0905   BP: (!) 149/83 (!) 153/79  (!) 148/74   Pulse: 90 84 85 82   Resp: 18 20  20   Temp: 98.1 °F (36.7 °C)      TempSrc: Oral      SpO2: 97% 96%  95%   Weight: 97.4 kg (214 lb 11.7 oz)      Height: 6' (1.829 m)       08/25/22 1000   BP: (!) 142/76   Pulse: 73   Resp: 20   Temp: 99.6 °F (37.6 °C)   TempSrc: Oral   SpO2: 96%   Weight:    Height:        Abnormal Lab Results:  Labs  Reviewed   CBC W/ AUTO DIFFERENTIAL - Abnormal; Notable for the following components:       Result Value    RBC 4.47 (*)     MCH 31.8 (*)     Platelets 127 (*)     MPV 8.8 (*)     Lymph # 0.5 (*)     Gran % 80.9 (*)     Lymph % 6.1 (*)     All other components within normal limits   COMPREHENSIVE METABOLIC PANEL - Abnormal; Notable for the following components:    Sodium 133 (*)     Glucose 179 (*)     Albumin 3.1 (*)     Total Bilirubin 1.4 (*)     Alkaline Phosphatase 54 (*)     All other components within normal limits   URINALYSIS, REFLEX TO URINE CULTURE - Abnormal; Notable for the following components:    Protein, UA 1+ (*)     Glucose, UA 3+ (*)     Occult Blood UA 1+ (*)     All other components within normal limits    Narrative:     Specimen Source->Urine   B-TYPE NATRIURETIC PEPTIDE - Abnormal; Notable for the following components:     (*)     All other components within normal limits   CK - Abnormal; Notable for the following components:     (*)     All other components within normal limits   TROPONIN I - Abnormal; Notable for the following components:    Troponin I 0.072 (*)     All other components within normal limits   SARS-COV-2 RDRP GENE - Normal    Narrative:     This test utilizes isothermal nucleic acid amplification   technology to detect the SARS-CoV-2 RdRp nucleic acid segment.   The analytical sensitivity (limit of detection) is 125 genome   equivalents/mL.   A POSITIVE result implies infection with the SARS-CoV-2 virus;   the patient is presumed to be contagious.     A NEGATIVE result means that SARS-CoV-2 nucleic acids are not   present above the limit of detection. A NEGATIVE result should be   treated as presumptive. It does not rule out the possibility of   COVID-19 and should not be the sole basis for treatment decisions.   If COVID-19 is strongly suspected based on clinical and exposure   history, re-testing using an alternate molecular assay should be   considered.    This test is only for use under the Food and Drug   Administration s Emergency Use Authorization (EUA).   Commercial kits are provided by [employer].   Performance characteristics of the EUA have been independently   verified by [facility] Department of   Pathology and Laboratory Medicine.   _________________________________________________________________   The authorized Fact Sheet for Healthcare Providers and the authorized Fact   Sheet for Patients of the ID NOW COVID-19 are available on the FDA   website:     https://www.fda.gov/media/188701/download  https://www.fda.gov/media/007934/download           URINALYSIS MICROSCOPIC    Narrative:     Specimen Source->Urine   TROPONIN I        All Lab Results:  Results for orders placed or performed during the hospital encounter of 08/25/22   CBC Auto Differential   Result Value Ref Range    WBC 7.37 3.90 - 12.70 K/uL    RBC 4.47 (L) 4.60 - 6.20 M/uL    Hemoglobin 14.2 14.0 - 18.0 g/dL    Hematocrit 40.2 40.0 - 54.0 %    MCV 90 82 - 98 fL    MCH 31.8 (H) 27.0 - 31.0 pg    MCHC 35.3 32.0 - 36.0 g/dL    RDW 12.5 11.5 - 14.5 %    Platelets 127 (L) 150 - 450 K/uL    MPV 8.8 (L) 9.2 - 12.9 fL    Immature Granulocytes 0.5 0.0 - 0.5 %    Gran # (ANC) 6.0 1.8 - 7.7 K/uL    Immature Grans (Abs) 0.04 0.00 - 0.04 K/uL    Lymph # 0.5 (L) 1.0 - 4.8 K/uL    Mono # 0.9 0.3 - 1.0 K/uL    Eos # 0.0 0.0 - 0.5 K/uL    Baso # 0.02 0.00 - 0.20 K/uL    nRBC 0 0 /100 WBC    Gran % 80.9 (H) 38.0 - 73.0 %    Lymph % 6.1 (L) 18.0 - 48.0 %    Mono % 12.1 4.0 - 15.0 %    Eosinophil % 0.1 0.0 - 8.0 %    Basophil % 0.3 0.0 - 1.9 %    Differential Method Automated    Comprehensive Metabolic Panel   Result Value Ref Range    Sodium 133 (L) 136 - 145 mmol/L    Potassium 4.2 3.5 - 5.1 mmol/L    Chloride 99 95 - 110 mmol/L    CO2 25 23 - 29 mmol/L    Glucose 179 (H) 70 - 110 mg/dL    BUN 21 8 - 23 mg/dL    Creatinine 1.2 0.5 - 1.4 mg/dL    Calcium 9.5 8.7 - 10.5 mg/dL    Total  Protein 7.0 6.0 - 8.4 g/dL    Albumin 3.1 (L) 3.5 - 5.2 g/dL    Total Bilirubin 1.4 (H) 0.1 - 1.0 mg/dL    Alkaline Phosphatase 54 (L) 55 - 135 U/L    AST 36 10 - 40 U/L    ALT 38 10 - 44 U/L    Anion Gap 9 8 - 16 mmol/L    eGFR >60 >60 mL/min/1.73 m^2   Urinalysis, Reflex to Urine Culture Urine, Clean Catch    Specimen: Urine   Result Value Ref Range    Specimen UA Urine, Clean Catch     Color, UA Yellow Yellow, Straw, Marce    Appearance, UA Clear Clear    pH, UA 6.0 5.0 - 8.0    Specific Gravity, UA 1.010 1.005 - 1.030    Protein, UA 1+ (A) Negative    Glucose, UA 3+ (A) Negative    Ketones, UA Negative Negative    Bilirubin (UA) Negative Negative    Occult Blood UA 1+ (A) Negative    Nitrite, UA Negative Negative    Urobilinogen, UA Negative <2.0 EU/dL    Leukocytes, UA Negative Negative   BNP   Result Value Ref Range     (H) 0 - 99 pg/mL   CK   Result Value Ref Range     (H) 20 - 200 U/L   Troponin I   Result Value Ref Range    Troponin I 0.072 (H) 0.000 - 0.026 ng/mL   Urinalysis Microscopic   Result Value Ref Range    RBC, UA 1 0 - 4 /hpf    WBC, UA 0 0 - 5 /hpf    Bacteria None None-Occ /hpf    Yeast, UA None None    Hyaline Casts, UA 0 0-1/lpf /lpf    Microscopic Comment SEE COMMENT    POCT COVID-19 Rapid Screening   Result Value Ref Range    POC Rapid COVID Negative Negative     Acceptable Yes          Imaging Results:  Imaging Results          X-Ray Chest AP Portable (Final result)  Result time 08/25/22 09:57:46    Final result by Jessica Munoz MD (08/25/22 09:57:46)                 Impression:      No definite acute cardiopulmonary abnormality.      Electronically signed by: Jessica Munoz  Date:    08/25/2022  Time:    09:57             Narrative:    EXAMINATION:  XR CHEST AP PORTABLE    CLINICAL HISTORY:  chest pain;    TECHNIQUE:  Single frontal view of the chest was performed.    COMPARISON:  None    FINDINGS:  ECG monitoring leads overlie the chest.No large  consolidation.  No significant pleural fluid.  No pneumothorax.    The cardiac silhouette is prominent in size.  The thoracic aorta is tortuous.    No acute osseous abnormality.  There is degenerative change of the spine.                               The EKG was ordered, reviewed, and independently interpreted by the ED provider.  Interpretation time: 8:23  Rate: 85 BPM  Rhythm: normal sinus rhythm  Interpretation: Incomplete RBBB. Left anterior fascicular block. Minimal voltage criteria for LVH, may be normal variant (Emeterio product). No STEMI.           The Emergency Provider reviewed the vital signs and test results, which are outlined above.    ED Discussion     10:08 AM: Discussed case with Yoandy Armstrong NP ([facility]). Dr. Butt agrees with current care and management of pt and accepts admission.   Admitting Service: Hospital Medicine  Admitting Physician: Dr. Butt  Admit to: Obs Unit    10:09 AM: Re-evaluated pt. I have discussed test results, shared treatment plan, and the need for admission with patient and family at bedside. Pt and family express understanding at this time and agree with all information. All questions answered. Pt and family have no further questions or concerns at this time. Pt is ready for admit.           ED Medication(s):  Medications   sodium chloride 0.9% flush 10 mL (has no administration in time range)   naloxone 0.4 mg/mL injection 0.02 mg (has no administration in time range)   glucose chewable tablet 16 g (has no administration in time range)   glucose chewable tablet 24 g (has no administration in time range)   glucagon (human recombinant) injection 1 mg (has no administration in time range)   dextrose 10% bolus 250 mL (has no administration in time range)   enoxaparin injection 40 mg (has no administration in time range)   polyethylene glycol packet 17 g (has no administration in time range)   aspirin chewable tablet 81 mg (has no administration in time range)    atorvastatin tablet 40 mg (has no administration in time range)           New Prescriptions    No medications on file         Medical Decision Making    Medical Decision Making:   Clinical Tests:   Lab Tests: Ordered and Reviewed  Radiological Study: Ordered and Reviewed  Medical Tests: Ordered and Reviewed           Scribe Attestation:   Scribe #1: I performed the above scribed service and the documentation accurately describes the services I performed. I attest to the accuracy of the note.    Attending:   Physician Attestation Statement for Scribe #1: I, Eren Stapleton MD, personally performed the services described in this documentation, as scribed by Zahira Wolfe, in my presence, and it is both accurate and complete.          Clinical Impression       ICD-10-CM ICD-9-CM   1. Chest pain  R07.9 786.50   2. Elevated troponin  R77.8 790.6       Disposition:   Disposition: Placed in Observation  Condition: Fair         Eren Stapleton MD  08/25/22 1043

## 2022-08-25 NOTE — PHARMACY MED REC
"Admission Medication History     The home medication history was taken by Nadeem Gallardo.    You may go to "Admission" then "Reconcile Home Medications" tabs to review and/or act upon these items.      The home medication list has been updated by the Pharmacy department.    Please read ALL comments highlighted in yellow.    Please address this information as you see fit.     Feel free to contact us if you have any questions or require assistance.      Medications listed below were obtained from: Patient/family, Analytic software- [employer] and Patient's pharmacy  (Not in a hospital admission)      Potential issues to be addressed PRIOR TO DISCHARGE: NONE    Nadeem Gallardo [facility]  Pharmacy Technician Specialist-Medication History  [facility] [phone]  Secure chat preferred     Current Outpatient Medications on File Prior to Encounter   Medication Sig Dispense Refill Last Dose    metFORMIN (GLUCOPHAGE) 500 MG tablet Take 500 mg by mouth 2 (two) times daily.   8/24/2022 at Unknown time    ondansetron (ZOFRAN-ODT) 4 MG TbDL Take 8 mg by mouth every 8 (eight) hours as needed.   8/24/2022 at Unknown time                             .          "

## 2022-08-25 NOTE — CONSULTS
[facility] - Emergency Dept.  Cardiology  Consult Note    Patient Name: Shahram London  MRN: [id number]  Admission Date: 8/25/2022  Hospital Length of Stay: 0 days  Code Status: Full Code   Attending Provider: Eren Stapleton MD   Consulting Provider: Jacky Menard MD  Primary Care Physician: Jimmy Montelongo MD  Principal Problem:<principal problem not specified>    Patient information was obtained from patient, past medical records and ER records.     Inpatient consult to Cardiology  Consult performed by: Jacky Menard MD  Consult ordered by: Amy Butt MD  Reason for consult: NSTEMI        Subjective:     Chief Complaint:  NSTEMI     HPI:   76 yo male with h/o prediatbetes, htn, hld, ex smoker for very short time ?   Comes in started to have chills but no fever 4 days ago   Then started to have chest pain radiating to the back today, trop mildly elevated, still having mid sternal chest pain   His ekg no LUDIN or depression   Echo prelim normal EF , no AS   Very remote h/o allergy to contrast, but no anaphylaxis   Discussed lhc/pci      No past medical history on file.    No past surgical history on file.    Review of patient's allergies indicates:   Allergen Reactions    Iodinated contrast media Other (See Comments)     UNKNOWN       No current facility-administered medications on file prior to encounter.     Current Outpatient Medications on File Prior to Encounter   Medication Sig    metFORMIN (GLUCOPHAGE) 500 MG tablet Take 500 mg by mouth 2 (two) times daily.    ondansetron (ZOFRAN-ODT) 4 MG TbDL Take 8 mg by mouth every 8 (eight) hours as needed.    [DISCONTINUED] atorvastatin (LIPITOR) 20 MG tablet atorvastatin 20 mg tablet    [DISCONTINUED] clotrimazole-betamethasone 1-0.05% (LOTRISONE) cream SMARTSIG:Topical Morning-Evening    [DISCONTINUED] gabapentin (NEURONTIN) 300 MG capsule gabapentin 300 mg capsule    [DISCONTINUED] indomethacin (INDOCIN) 50 MG capsule indomethacin 50 mg  capsule    [DISCONTINUED] ketoconazole (NIZORAL) 200 mg Tab ketoconazole 200 mg tablet   Take 1 tablet every day by oral route.    [DISCONTINUED] meloxicam (MOBIC) 7.5 MG tablet meloxicam 7.5 mg tablet     Family History    None       Tobacco Use    Smoking status: Not on file    Smokeless tobacco: Not on file   Substance and Sexual Activity    Alcohol use: Not on file    Drug use: Not on file    Sexual activity: Not on file     Review of Systems   Constitutional: Negative for fever and malaise/fatigue.   HENT:  Negative for sore throat.    Eyes:  Negative for blurred vision.   Cardiovascular:  Positive for chest pain. Negative for claudication, cyanosis, dyspnea on exertion, irregular heartbeat, leg swelling, near-syncope, orthopnea, palpitations, paroxysmal nocturnal dyspnea and syncope.   Respiratory:  Negative for cough and hemoptysis.    Hematologic/Lymphatic: Negative for bleeding problem.   Skin:  Negative for rash.   Musculoskeletal:  Negative for falls.   Gastrointestinal:  Negative for abdominal pain.   Genitourinary: Negative.    Neurological: Negative.    Psychiatric/Behavioral:  Negative for altered mental status and substance abuse.    Objective:     Vital Signs (Most Recent):  Temp: 99.6 °F (37.6 °C) (08/25/22 1000)  Pulse: 73 (08/25/22 1000)  Resp: 20 (08/25/22 1000)  BP: (!) 142/76 (08/25/22 1000)  SpO2: 96 % (08/25/22 1000)   Vital Signs (24h Range):  Temp:  [98.1 °F (36.7 °C)-99.6 °F (37.6 °C)] 99.6 °F (37.6 °C)  Pulse:  [73-90] 73  Resp:  [18-20] 20  SpO2:  [95 %-97 %] 96 %  BP: (142-153)/(74-83) 142/76     Weight: 97.4 kg (214 lb 11.7 oz)  Body mass index is 29.12 kg/m².    SpO2: 96 %  O2 Device (Oxygen Therapy): room air    No intake or output data in the 24 hours ending 08/25/22 1212    Lines/Drains/Airways       Peripheral Intravenous Line  Duration                  Peripheral IV - Single Lumen 08/25/22 0908 20 G Left Upper Arm <1 day                    Physical Exam  Vitals reviewed.    Constitutional:       Appearance: He is well-developed.   HENT:      Head: Normocephalic and atraumatic.   Eyes:      General: No scleral icterus.     Conjunctiva/sclera: Conjunctivae normal.   Cardiovascular:      Rate and Rhythm: Normal rate and regular rhythm.      Pulses: Intact distal pulses.      Heart sounds: Normal heart sounds. No murmur heard.  Pulmonary:      Effort: No respiratory distress.      Breath sounds: No wheezing or rales.   Chest:      Chest wall: No tenderness.   Abdominal:      General: Bowel sounds are normal. There is no distension.      Palpations: Abdomen is soft.      Tenderness: There is no guarding.   Musculoskeletal:         General: Normal range of motion.      Cervical back: Normal range of motion and neck supple.   Skin:     General: Skin is warm.   Neurological:      Mental Status: He is alert and oriented to person, place, and time.       Significant Labs:   Recent Lab Results         08/25/22  1047   08/25/22  0933   08/25/22  0905   08/25/22  0852        Albumin     3.1         Alkaline Phosphatase     54         ALT     38         Anion Gap     9         Appearance, UA       Clear       AST     36         Bacteria, UA       None       Baso #     0.02         Basophil %     0.3         Bilirubin (UA)       Negative       BILIRUBIN TOTAL     1.4  Comment: For infants and newborns, interpretation of results should be based  on gestational age, weight and in agreement with clinical  observations.    Premature Infant recommended reference ranges:  Up to 24 hours.............<8.0 mg/dL  Up to 48 hours............<12.0 mg/dL  3-5 days..................<15.0 mg/dL  6-29 days.................<15.0 mg/dL           BNP     107  Comment: Values of less than 100 pg/ml are consistent with non-CHF populations.         BUN     21         Calcium     9.5         Chloride     99         CO2     25         Color, UA       Yellow       CPK     205         Creatinine     1.2         Differential  Method     Automated         eGFR     >60         Eos #     0.0         Eosinophil %     0.1         Glucose     179         Glucose, UA       3+       Gran # (ANC)     6.0         Gran %     80.9         Hematocrit     40.2         Hemoglobin     14.2         Hyaline Casts, UA       0       Immature Grans (Abs)     0.04  Comment: Mild elevation in immature granulocytes is non specific and   can be seen in a variety of conditions including stress response,   acute inflammation, trauma and pregnancy. Correlation with other   laboratory and clinical findings is essential.           Immature Granulocytes     0.5         Ketones, UA       Negative       Leukocytes, UA       Negative       Lymph #     0.5         Lymph %     6.1         MCH     31.8         MCHC     35.3         MCV     90         Microscopic Comment       SEE COMMENT  Comment: Other formed elements not mentioned in the report are not   present in the microscopic examination.          Mono #     0.9         Mono %     12.1         MPV     8.8         NITRITE UA       Negative       nRBC     0         Occult Blood UA       1+       pH, UA       6.0       Platelets     127         Potassium     4.2         PROTEIN TOTAL     7.0         Protein, UA       1+  Comment: Recommend a 24 hour urine protein or a urine   protein/creatinine ratio if globulin induced proteinuria is  clinically suspected.          Acceptable   Yes           RBC     4.47         RBC, UA       1       RDW     12.5         SARS-CoV-2 RNA, Amplification, Qual   Negative           Sodium     133         Specific [place], UA       1.010       Specimen UA       Urine, Clean Catch       Troponin I 0.060  Comment: The reference interval for Troponin I represents the 99th percentile   cutoff   for our facility and is consistent with 3rd generation assay   performance.       0.072  Comment: The reference interval for Troponin I represents the 99th percentile   cutoff   for our  facility and is consistent with 3rd generation assay   performance.           UROBILINOGEN UA       Negative       WBC, UA       0       WBC     7.37         Yeast, UA       None               Significant Imaging: EKG: NSR, NON SPECIFIC    Assessment and Plan:     Prediabetes  AS PER HOSPITAL MEDICINE     NSTEMI (non-ST elevated myocardial infarction)  IV HEPARIN   CW ASA, STATIN   NITRO PATCH   CONTRAST ALLERGY PREMEDICATION   LHC/PCI , DISCUSSED WITH WIFE , SON AND THE PATIENT   WILL PLAN FOR LHC TODAY   AGREEABLE , CONTINUES TO HAVE CHEST PAIN   COVID NEGATIVE   CXR REVIEWED        VTE Risk Mitigation (From admission, onward)         Ordered     enoxaparin injection 40 mg  Daily         08/25/22 1026     IP VTE HIGH RISK PATIENT  Once         08/25/22 1026     Place sequential compression device  Until discontinued         08/25/22 1026                Thank you for your consult. I will follow-up with patient. Please contact us if you have any additional questions.    Jacky Menard MD  Cardiology   [facility] - Emergency Dept.

## 2022-08-25 NOTE — Clinical Note
The catheter was repositioned into the left ventricle. Hemodynamics were performed.  and Pullback was recorded.   Xanax  Last visit: 11.20.18  Last refill: 11.20.18 #20    Future appointments:

## 2022-08-25 NOTE — Clinical Note
140 ml of contrast were injected throughout the case. 0 mL of contrast was the total wasted during the case. 140 mL was the total amount used during the case.

## 2022-08-25 NOTE — ASSESSMENT & PLAN NOTE
IV HEPARIN   CW ASA, STATIN   NITRO PATCH   CONTRAST ALLERGY PREMEDICATION   LHC/PCI , DISCUSSED WITH WIFE , SON AND THE PATIENT   AGREEABLE , CONTINUES TO HAVE CHEST PAIN   COVID NEGATIVE   CXR REVIEWED

## 2022-08-26 ENCOUNTER — TELEPHONE (OUTPATIENT)
Dept: CARDIOLOGY | Facility: HOSPITAL | Age: 77
End: 2022-08-26
Payer: MEDICARE

## 2022-08-26 VITALS
RESPIRATION RATE: 18 BRPM | OXYGEN SATURATION: 94 % | WEIGHT: 214.75 LBS | HEIGHT: 72 IN | DIASTOLIC BLOOD PRESSURE: 58 MMHG | BODY MASS INDEX: 29.09 KG/M2 | TEMPERATURE: 97 F | HEART RATE: 62 BPM | SYSTOLIC BLOOD PRESSURE: 107 MMHG

## 2022-08-26 PROBLEM — I21.4 NSTEMI (NON-ST ELEVATED MYOCARDIAL INFARCTION): Status: RESOLVED | Noted: 2022-08-25 | Resolved: 2022-08-26

## 2022-08-26 PROBLEM — R07.9 CHEST PAIN: Status: ACTIVE | Noted: 2022-08-26

## 2022-08-26 PROBLEM — R79.89 ELEVATED TROPONIN: Status: ACTIVE | Noted: 2022-08-26

## 2022-08-26 LAB
ANION GAP SERPL CALC-SCNC: 11 MMOL/L (ref 8–16)
BASOPHILS # BLD AUTO: 0.01 K/UL (ref 0–0.2)
BASOPHILS NFR BLD: 0.1 % (ref 0–1.9)
BUN SERPL-MCNC: 31 MG/DL (ref 8–23)
CALCIUM SERPL-MCNC: 8.5 MG/DL (ref 8.7–10.5)
CHLORIDE SERPL-SCNC: 102 MMOL/L (ref 95–110)
CHOLEST SERPL-MCNC: 130 MG/DL (ref 120–199)
CHOLEST/HDLC SERPL: 7.2 {RATIO} (ref 2–5)
CO2 SERPL-SCNC: 21 MMOL/L (ref 23–29)
CREAT SERPL-MCNC: 1.1 MG/DL (ref 0.5–1.4)
DIFFERENTIAL METHOD: ABNORMAL
EOSINOPHIL # BLD AUTO: 0 K/UL (ref 0–0.5)
EOSINOPHIL NFR BLD: 0 % (ref 0–8)
ERYTHROCYTE [DISTWIDTH] IN BLOOD BY AUTOMATED COUNT: 12.4 % (ref 11.5–14.5)
EST. GFR  (NO RACE VARIABLE): >60 ML/MIN/1.73 M^2
GLUCOSE SERPL-MCNC: 248 MG/DL (ref 70–110)
HCT VFR BLD AUTO: 38.3 % (ref 40–54)
HDLC SERPL-MCNC: 18 MG/DL (ref 40–75)
HDLC SERPL: 13.8 % (ref 20–50)
HGB BLD-MCNC: 12.8 G/DL (ref 14–18)
IMM GRANULOCYTES # BLD AUTO: 0.08 K/UL (ref 0–0.04)
IMM GRANULOCYTES NFR BLD AUTO: 0.8 % (ref 0–0.5)
LDLC SERPL CALC-MCNC: 90.4 MG/DL (ref 63–159)
LYMPHOCYTES # BLD AUTO: 0.5 K/UL (ref 1–4.8)
LYMPHOCYTES NFR BLD: 5.1 % (ref 18–48)
MCH RBC QN AUTO: 31 PG (ref 27–31)
MCHC RBC AUTO-ENTMCNC: 33.4 G/DL (ref 32–36)
MCV RBC AUTO: 93 FL (ref 82–98)
MONOCYTES # BLD AUTO: 0.5 K/UL (ref 0.3–1)
MONOCYTES NFR BLD: 5.2 % (ref 4–15)
NEUTROPHILS # BLD AUTO: 8.5 K/UL (ref 1.8–7.7)
NEUTROPHILS NFR BLD: 88.8 % (ref 38–73)
NONHDLC SERPL-MCNC: 112 MG/DL
NRBC BLD-RTO: 0 /100 WBC
PLATELET # BLD AUTO: 167 K/UL (ref 150–450)
PMV BLD AUTO: 9.3 FL (ref 9.2–12.9)
POCT GLUCOSE: 233 MG/DL (ref 70–110)
POTASSIUM SERPL-SCNC: 4.5 MMOL/L (ref 3.5–5.1)
RBC # BLD AUTO: 4.13 M/UL (ref 4.6–6.2)
SODIUM SERPL-SCNC: 134 MMOL/L (ref 136–145)
TRIGL SERPL-MCNC: 108 MG/DL (ref 30–150)
WBC # BLD AUTO: 9.58 K/UL (ref 3.9–12.7)

## 2022-08-26 PROCEDURE — 25000003 PHARM REV CODE 250: Performed by: INTERNAL MEDICINE

## 2022-08-26 PROCEDURE — 99213 PR OFFICE/OUTPT VISIT, EST, LEVL III, 20-29 MIN: ICD-10-PCS | Mod: ,,, | Performed by: INTERNAL MEDICINE

## 2022-08-26 PROCEDURE — 99213 OFFICE O/P EST LOW 20 MIN: CPT | Mod: ,,, | Performed by: INTERNAL MEDICINE

## 2022-08-26 PROCEDURE — 80048 BASIC METABOLIC PNL TOTAL CA: CPT | Performed by: INTERNAL MEDICINE

## 2022-08-26 PROCEDURE — 80061 LIPID PANEL: CPT | Performed by: INTERNAL MEDICINE

## 2022-08-26 PROCEDURE — 36415 COLL VENOUS BLD VENIPUNCTURE: CPT | Performed by: INTERNAL MEDICINE

## 2022-08-26 PROCEDURE — G0378 HOSPITAL OBSERVATION PER HR: HCPCS

## 2022-08-26 PROCEDURE — 85025 COMPLETE CBC W/AUTO DIFF WBC: CPT | Performed by: INTERNAL MEDICINE

## 2022-08-26 RX ORDER — ATORVASTATIN CALCIUM 40 MG/1
40 TABLET, FILM COATED ORAL NIGHTLY
Qty: 90 TABLET | Refills: 3 | Status: SHIPPED | OUTPATIENT
Start: 2022-08-26 | End: 2023-08-26

## 2022-08-26 RX ORDER — NAPROXEN SODIUM 220 MG/1
81 TABLET, FILM COATED ORAL DAILY
Qty: 30 TABLET | Refills: 0 | Status: SHIPPED | OUTPATIENT
Start: 2022-08-27 | End: 2023-08-27

## 2022-08-26 RX ORDER — METFORMIN HYDROCHLORIDE 500 MG/1
500 TABLET ORAL 2 TIMES DAILY
Qty: 60 TABLET | Refills: 0
Start: 2022-08-27

## 2022-08-26 RX ORDER — METOPROLOL SUCCINATE 25 MG/1
12.5 TABLET, EXTENDED RELEASE ORAL DAILY
Qty: 15 TABLET | Refills: 11 | Status: SHIPPED | OUTPATIENT
Start: 2022-08-27 | End: 2023-08-27

## 2022-08-26 RX ADMIN — ACETAMINOPHEN 650 MG: 325 TABLET ORAL at 12:08

## 2022-08-26 RX ADMIN — METOPROLOL SUCCINATE 25 MG: 25 TABLET, EXTENDED RELEASE ORAL at 08:08

## 2022-08-26 RX ADMIN — NITROGLYCERIN 1 PATCH: 0.4 PATCH TRANSDERMAL at 08:08

## 2022-08-26 RX ADMIN — ASPIRIN 81 MG CHEWABLE TABLET 81 MG: 81 TABLET CHEWABLE at 08:08

## 2022-08-26 RX ADMIN — POLYETHYLENE GLYCOL 3350 17 G: 17 POWDER, FOR SOLUTION ORAL at 08:08

## 2022-08-26 NOTE — PROGRESS NOTES
[facility] Medicine  Progress Note    Patient Name: Shahram London  MRN: [id number]  Patient Class: OP- Observation   Admission Date: 8/25/2022  Length of Stay: 0 days  Attending Physician: Amy Butt MD  Primary Care Provider: Jimmy Montelongo MD        Subjective:     Principal Problem:NSTEMI (non-ST elevated myocardial infarction)        HPI:   Shahram London is a 77 y.o. male patient who presents to the Emergency Department for L-sided CP radiating to the back which onset gradually this morning    Troponin -0.072 , CXR  no acute process, ekg -nsr , incomplete RBBB     Admitted with NSTEMI -taken to [facility] today       Overview/Hospital Course:  08/26 - Admitted with NSTEMI , [facility] 08/25/22  mild to moderate disease , preserved EF               Patient was also hyponatremic due to volume depletion which improved with IVF                Stable for discharge on BB, ASA, Statin        No new subjective & objective note has been filed under this hospital service since the last note was generated.      Assessment/Plan:      No notes have been filed under this hospital service.  Service: Hospital Medicine    VTE Risk Mitigation (From admission, onward)         Ordered     IP VTE HIGH RISK PATIENT  Once         08/25/22 1026     Place sequential compression device  Until discontinued         08/25/22 1026                Discharge Planning   PURNIMA: 8/26/2022     Code Status: Full Code   Is the patient medically ready for discharge?:     Reason for patient still in hospital (select all that apply): Treatment  Discharge Plan A: Home                  Amy Butt MD  Department of Hospital Medicine   [facility]

## 2022-08-26 NOTE — SUBJECTIVE & OBJECTIVE
Review of Systems   Constitutional: Negative.   HENT: Negative.     Eyes: Negative.    Cardiovascular: Negative.    Respiratory: Negative.     Endocrine: Negative.    Hematologic/Lymphatic: Negative.    Skin: Negative.    Musculoskeletal: Negative.    Gastrointestinal: Negative.    Genitourinary: Negative.    Neurological: Negative.    Psychiatric/Behavioral: Negative.     Allergic/Immunologic: Negative.    Objective:     Vital Signs (Most Recent):  Temp: 97.2 °F (36.2 °C) (08/26/22 1140)  Pulse: 62 (08/26/22 1140)  Resp: 18 (08/26/22 1140)  BP: (!) 107/58 (08/26/22 1140)  SpO2: (!) 94 % (08/26/22 1140)   Vital Signs (24h Range):  Temp:  [97.2 °F (36.2 °C)-99.2 °F (37.3 °C)] 97.2 °F (36.2 °C)  Pulse:  [52-78] 62  Resp:  [16-30] 18  SpO2:  [91 %-95 %] 94 %  BP: ()/(47-74) 107/58     Weight: 97.4 kg (214 lb 11.7 oz)  Body mass index is 29.12 kg/m².     SpO2: (!) 94 %  O2 Device (Oxygen Therapy): room air      Intake/Output Summary (Last 24 hours) at 8/26/2022 1146  Last data filed at 8/26/2022 0800  Gross per 24 hour   Intake 118 ml   Output --   Net 118 ml       Lines/Drains/Airways       Peripheral Intravenous Line  Duration                  Peripheral IV - Single Lumen 08/25/22 1247 20 G Right Forearm <1 day                    Physical Exam  Vitals and nursing note reviewed.   Constitutional:       General: He is not in acute distress.     Appearance: Normal appearance. He is well-developed. He is not diaphoretic.   HENT:      Head: Normocephalic and atraumatic.   Eyes:      General:         Right eye: No discharge.         Left eye: No discharge.      Pupils: Pupils are equal, round, and reactive to light.   Neck:      Thyroid: No thyromegaly.      Vascular: No JVD.      Trachea: No tracheal deviation.   Cardiovascular:      Rate and Rhythm: Normal rate and regular rhythm.      Heart sounds: Normal heart sounds, S1 normal and S2 normal. No murmur heard.  Pulmonary:      Effort: Pulmonary effort is  normal. No respiratory distress.      Breath sounds: Normal breath sounds. No wheezing or rales.   Abdominal:      General: There is no distension.      Palpations: Abdomen is soft.      Tenderness: There is no rebound.   Musculoskeletal:      Cervical back: Neck supple.      Right lower leg: No edema.      Left lower leg: No edema.   Skin:     General: Skin is warm and dry.      Findings: No erythema.      Comments: Radial access site C/D/I; no erythema drainage, intact pulse   Neurological:      General: No focal deficit present.      Mental Status: He is alert and oriented to person, place, and time.   Psychiatric:         Mood and Affect: Mood normal.         Behavior: Behavior normal.         Thought Content: Thought content normal.       Significant Labs: CMP   Recent Labs   Lab 08/25/22  0905 08/26/22  0503   * 134*   K 4.2 4.5   CL 99 102   CO2 25 21*   * 248*   BUN 21 31*   CREATININE 1.2 1.1   CALCIUM 9.5 8.5*   PROT 7.0  --    ALBUMIN 3.1*  --    BILITOT 1.4*  --    ALKPHOS 54*  --    AST 36  --    ALT 38  --    ANIONGAP 9 11   , CBC   Recent Labs   Lab 08/25/22  0905 08/25/22  1255 08/26/22  0503   WBC 7.37 7.79 9.58   HGB 14.2 13.7* 12.8*   HCT 40.2 38.7* 38.3*   * 121* 167   , Troponin   Recent Labs   Lab 08/25/22  0905 08/25/22  1047   TROPONINI 0.072* 0.060*   , and All pertinent lab results from the last 24 hours have been reviewed.    Significant Imaging: Echocardiogram: Transthoracic echo (TTE) complete (Cupid Only):   Results for orders placed or performed during the hospital encounter of 08/25/22   Echo   Result Value Ref Range    BSA 2.22 m2    TDI SEPTAL 0.11 m/s    LV LATERAL E/E' RATIO 11.75 m/s    LV SEPTAL E/E' RATIO 8.55 m/s    LA WIDTH 3.80 cm    IVC diameter 1.77 cm    Left Ventricular Outflow Tract Mean Velocity 0.86 cm/s    Left Ventricular Outflow Tract Mean Gradient 3.33 mmHg    TDI LATERAL 0.08 m/s    LVIDd 4.43 3.5 - 6.0 cm    IVS 1.29 (A) 0.6 - 1.1 cm     Posterior Wall 1.37 (A) 0.6 - 1.1 cm    Ao root annulus 3.51 cm    LVIDs 3.14 2.1 - 4.0 cm    FS 29 28 - 44 %    LA volume 64.23 cm3    STJ 2.90 cm    Ascending aorta 2.88 cm    LV mass 224.79 g    LA size 3.97 cm    TAPSE 2.10 cm    Left Ventricle Relative Wall Thickness 0.62 cm    AV regurgitation pressure 1/2 time [id number] ms    AV mean gradient 9 mmHg    AV valve area 2.28 cm2    AV Velocity Ratio 0.71     AV index (prosthetic) 0.71     E/A ratio 1.04     Mean e' 0.10 m/s    E wave deceleration time 148.56 msec    IVRT 60.89 msec    LVOT diameter 2.03 cm    LVOT area 3.2 cm2    LVOT peak judd 1.26 m/s    LVOT peak VTI 25.90 cm    Ao peak judd 1.78 m/s    Ao VTI 36.7 cm    RVOT peak judd 0.74 m/s    RVOT peak VTI 15.0 cm    Mr max judd 3.06 m/s    LVOT stroke volume 83.78 cm3    AV peak gradient 13 mmHg    PV mean gradient 1.51 mmHg    E/E' ratio 9.89 m/s    MV Peak E Judd 0.94 m/s    AR Max Judd 2.84 m/s    MV Peak A Judd 0.90 m/s    LV Systolic Volume 39.25 mL    LV Systolic Volume Index 17.9 mL/m2    LV Diastolic Volume 89.27 mL    LV Diastolic Volume Index 40.76 mL/m2    LA Volume Index 29.3 mL/m2    LV Mass Index 103 g/m2    RA Major Axis 3.85 cm    Left Atrium Minor Axis 5.09 cm    Left Atrium Major Axis 4.93 cm    RA Width 2.80 cm    Right Atrial Pressure (from IVC) 3 mmHg    EF 55 %    Narrative    · The left ventricle is normal in size with concentric remodeling and   normal systolic function.  · Normal left ventricular diastolic function.  · Normal right ventricular size with normal right ventricular systolic   function.  · Normal central venous pressure (3 mmHg).  · The estimated ejection fraction is 55%.  · Mild aortic regurgitation.  · Mild tricuspid regurgitation.      , EKG: Reviewed, and X-Ray: CXR: X-Ray Chest 1 View (CXR): No results found for this visit on 08/25/22. and X-Ray Chest PA and Lateral (CXR): No results found for this visit on 08/25/22.

## 2022-08-26 NOTE — HOSPITAL COURSE
8/26/22-Patient seen and examined today, s/p [facility] yesterday, medical mgmt recommended. Feels well. No chest or back pain. Labs stable.

## 2022-08-26 NOTE — PLAN OF CARE
[facility] - Telemetry (Hospital)  Discharge Final Note    Primary Care Provider: Jimmy Montelongo MD    Expected Discharge Date: 8/26/2022    Final Discharge Note (most recent)     Final Note - 08/26/22 1212        Final Note    Assessment Type Final Discharge Note     Anticipated Discharge Disposition Home or Self Care        Post-Acute Status    Discharge Delays None known at this time               Pt to discharge home. No CM needs.     Important Message from Medicare

## 2022-08-26 NOTE — HOSPITAL COURSE
08/26 - Admitted with NSTEMI , [facility] 08/25/22  mild to moderate disease , preserved EF               Patient was also hyponatremic due to volume depletion which improved with IVF                Stable for discharge on BB, ASA, Statin

## 2022-08-26 NOTE — PLAN OF CARE
[facility] - Telemetry (Hospital)  Initial Discharge Assessment       Primary Care Provider: Jimmy Montelongo MD    Admission Diagnosis: Elevated troponin [R77.8]  Chest pain [R07.9]  Chest pain, unspecified type [R07.9]    Admission Date: 8/25/2022  Expected Discharge Date:     Discharge Barriers Identified: None    Payor: [employer] MANAGED MEDICARE / Plan: [employer] MEDICARE HMO / Product Type: Capitation /     Extended Emergency Contact Information  Primary Emergency Contact: Chris London  Mobile Phone: [phone]  Relation: Son  Preferred language: English   needed? No  Secondary Emergency Contact: Jose London  Mobile Phone: [phone]  Relation: Son  Preferred language: English   needed? No    Discharge Plan A: Home         [facility] [place] - [address]  [address] [facility] [address]  [place] [address]  Phone: [phone] Fax: [phone]      Initial Assessment (most recent)     Adult Discharge Assessment - 08/26/22 1046        Discharge Assessment    Assessment Type Discharge Planning Assessment     Confirmed/corrected address, phone number and insurance Yes     Confirmed Demographics Correct on Facesheet     Source of Information patient     Communicated PURNIMA with patient/caregiver Date not available/Unable to determine     Reason For Admission NSTEMI     Lives With spouse;child(braeden), adult     Facility Arrived From: Home     Do you expect to return to your current living situation? Yes     Do you have help at home or someone to help you manage your care at home? Yes     Who are your caregiver(s) and their phone number(s)? Pt's spouse and son     Prior to hospitilization cognitive status: Alert/Oriented     Current cognitive status: Alert/Oriented     Walking or Climbing Stairs Difficulty none     Dressing/Bathing Difficulty none     Equipment Currently Used at Home none     Readmission within 30 days? No     Patient currently being followed by outpatient case management?  No     Do you currently have service(s) that help you manage your care at home? No     Do you take prescription medications? Yes     Do you have prescription coverage? Yes     Do you have any problems affording any of your prescribed medications? No     Is the patient taking medications as prescribed? yes     Who is going to help you get home at discharge? Pt's sonJose     How do you get to doctors appointments? car, drives self;family or friend will provide     Are you on dialysis? No     Do you take coumadin? No     Discharge Plan A Home     DME Needed Upon Discharge  none     Discharge Plan discussed with: Patient     Discharge Barriers Identified None               Robertor met with patient and sonJose, at the bedside to complete discharge assessment. Patient reports living independently at home with spouse and son. Patient still drives and does not use DME. No CM needs for discharge.     Pt's whiteboard updated to reflect discharge disposition and SW contact information.

## 2022-08-26 NOTE — PROGRESS NOTES
[facility] ([facility])  Cardiology  Progress Note    Patient Name: Shahram London  MRN: [id number]  Admission Date: 8/25/2022  Hospital Length of Stay: 0 days  Code Status: Full Code   Attending Physician: Amy Butt MD   Primary Care Physician: Jimmy Montelongo MD  Expected Discharge Date: 8/26/2022  Principal Problem:NSTEMI (non-ST elevated myocardial infarction)    Subjective:   HPI:  76 yo male with h/o prediatbetes, htn, hld, ex smoker for very short time ?   Comes in started to have chills but no fever 4 days ago   Then started to have chest pain radiating to the back today, trop mildly elevated, still having mid sternal chest pain   His ekg no LUDIN or depression   Echo prelim normal EF , no AS   Very remote h/o allergy to contrast, but no anaphylaxis   Discussed lhc/pci    Hospital Course:   8/26/22-Patient seen and examined today, s/p LHC yesterday, medical mgmt recommended. Feels well. No chest or back pain. Labs stable.          Review of Systems   Constitutional: Negative.   HENT: Negative.     Eyes: Negative.    Cardiovascular: Negative.    Respiratory: Negative.     Endocrine: Negative.    Hematologic/Lymphatic: Negative.    Skin: Negative.    Musculoskeletal: Negative.    Gastrointestinal: Negative.    Genitourinary: Negative.    Neurological: Negative.    Psychiatric/Behavioral: Negative.     Allergic/Immunologic: Negative.    Objective:     Vital Signs (Most Recent):  Temp: 97.2 °F (36.2 °C) (08/26/22 1140)  Pulse: 62 (08/26/22 1140)  Resp: 18 (08/26/22 1140)  BP: (!) 107/58 (08/26/22 1140)  SpO2: (!) 94 % (08/26/22 1140)   Vital Signs (24h Range):  Temp:  [97.2 °F (36.2 °C)-99.2 °F (37.3 °C)] 97.2 °F (36.2 °C)  Pulse:  [52-78] 62  Resp:  [16-30] 18  SpO2:  [91 %-95 %] 94 %  BP: ()/(47-74) 107/58     Weight: 97.4 kg (214 lb 11.7 oz)  Body mass index is 29.12 kg/m².     SpO2: (!) 94 %  O2 Device (Oxygen Therapy): room air      Intake/Output Summary (Last 24 hours) at 8/26/2022  1146  Last data filed at 8/26/2022 0800  Gross per 24 hour   Intake 118 ml   Output --   Net 118 ml       Lines/Drains/Airways       Peripheral Intravenous Line  Duration                  Peripheral IV - Single Lumen 08/25/22 1247 20 G Right Forearm <1 day                    Physical Exam  Vitals and nursing note reviewed.   Constitutional:       General: He is not in acute distress.     Appearance: Normal appearance. He is well-developed. He is not diaphoretic.   HENT:      Head: Normocephalic and atraumatic.   Eyes:      General:         Right eye: No discharge.         Left eye: No discharge.      Pupils: Pupils are equal, round, and reactive to light.   Neck:      Thyroid: No thyromegaly.      Vascular: No JVD.      Trachea: No tracheal deviation.   Cardiovascular:      Rate and Rhythm: Normal rate and regular rhythm.      Heart sounds: Normal heart sounds, S1 normal and S2 normal. No murmur heard.  Pulmonary:      Effort: Pulmonary effort is normal. No respiratory distress.      Breath sounds: Normal breath sounds. No wheezing or rales.   Abdominal:      General: There is no distension.      Palpations: Abdomen is soft.      Tenderness: There is no rebound.   Musculoskeletal:      Cervical back: Neck supple.      Right lower leg: No edema.      Left lower leg: No edema.   Skin:     General: Skin is warm and dry.      Findings: No erythema.      Comments: Radial access site C/D/I; no erythema drainage, intact pulse   Neurological:      General: No focal deficit present.      Mental Status: He is alert and oriented to person, place, and time.   Psychiatric:         Mood and Affect: Mood normal.         Behavior: Behavior normal.         Thought Content: Thought content normal.       Significant Labs: CMP   Recent Labs   Lab 08/25/22  0905 08/26/22  0503   * 134*   K 4.2 4.5   CL 99 102   CO2 25 21*   * 248*   BUN 21 31*   CREATININE 1.2 1.1   CALCIUM 9.5 8.5*   PROT 7.0  --    ALBUMIN 3.1*  --     BILITOT 1.4*  --    ALKPHOS 54*  --    AST 36  --    ALT 38  --    ANIONGAP 9 11   , CBC   Recent Labs   Lab 08/25/22  0905 08/25/22  1255 08/26/22  0503   WBC 7.37 7.79 9.58   HGB 14.2 13.7* 12.8*   HCT 40.2 38.7* 38.3*   * 121* 167   , Troponin   Recent Labs   Lab 08/25/22  0905 08/25/22  1047   TROPONINI 0.072* 0.060*   , and All pertinent lab results from the last 24 hours have been reviewed.    Significant Imaging: Echocardiogram: Transthoracic echo (TTE) complete ([facility] Only):   Results for orders placed or performed during the hospital encounter of 08/25/22   Echo   Result Value Ref Range    BSA 2.22 m2    TDI SEPTAL 0.11 m/s    LV LATERAL E/E' RATIO 11.75 m/s    LV SEPTAL E/E' RATIO 8.55 m/s    LA WIDTH 3.80 cm    IVC diameter 1.77 cm    Left Ventricular Outflow Tract Mean Velocity 0.86 cm/s    Left Ventricular Outflow Tract Mean Gradient 3.33 mmHg    TDI LATERAL 0.08 m/s    LVIDd 4.43 3.5 - 6.0 cm    IVS 1.29 (A) 0.6 - 1.1 cm    Posterior Wall 1.37 (A) 0.6 - 1.1 cm    Ao root annulus 3.51 cm    LVIDs 3.14 2.1 - 4.0 cm    FS 29 28 - 44 %    LA volume 64.23 cm3    STJ 2.90 cm    Ascending aorta 2.88 cm    LV mass 224.79 g    LA size 3.97 cm    TAPSE 2.10 cm    Left Ventricle Relative Wall Thickness 0.62 cm    AV regurgitation pressure 1/2 time [id number] ms    AV mean gradient 9 mmHg    AV valve area 2.28 cm2    AV Velocity Ratio 0.71     AV index (prosthetic) 0.71     E/A ratio 1.04     Mean e' 0.10 m/s    E wave deceleration time 148.56 msec    IVRT 60.89 msec    LVOT diameter 2.03 cm    LVOT area 3.2 cm2    LVOT peak judd 1.26 m/s    LVOT peak VTI 25.90 cm    Ao peak judd 1.78 m/s    Ao VTI 36.7 cm    RVOT peak judd 0.74 m/s    RVOT peak VTI 15.0 cm    Mr max judd 3.06 m/s    LVOT stroke volume 83.78 cm3    AV peak gradient 13 mmHg    PV mean gradient 1.51 mmHg    E/E' ratio 9.89 m/s    MV Peak E Judd 0.94 m/s    AR Max Judd 2.84 m/s    MV Peak A Judd 0.90 m/s    LV Systolic Volume 39.25 mL     LV Systolic Volume Index 17.9 mL/m2    LV Diastolic Volume 89.27 mL    LV Diastolic Volume Index 40.76 mL/m2    LA Volume Index 29.3 mL/m2    LV Mass Index 103 g/m2    RA Major Axis 3.85 cm    Left Atrium Minor Axis 5.09 cm    Left Atrium Major Axis 4.93 cm    RA Width 2.80 cm    Right Atrial Pressure (from IVC) 3 mmHg    EF 55 %    Narrative    · The left ventricle is normal in size with concentric remodeling and   normal systolic function.  · Normal left ventricular diastolic function.  · Normal right ventricular size with normal right ventricular systolic   function.  · Normal central venous pressure (3 mmHg).  · The estimated ejection fraction is 55%.  · Mild aortic regurgitation.  · Mild tricuspid regurgitation.      , EKG: Reviewed, and X-Ray: CXR: X-Ray Chest 1 View (CXR): No results found for this visit on 08/25/22. and X-Ray Chest PA and Lateral (CXR): No results found for this visit on 08/25/22.    Assessment and Plan:   Patient who presents with chest pain/elevated troponin. [facility] yesterday, med mgmt. Follow-up in clinic.    Chest pain  -See plan under elevated troponin    Elevated troponin  -s/p [facility] yesterday, med mgmt recommended  -Continue ASA, BB, statin    Prediabetes  AS PER HOSPITAL MEDICINE         VTE Risk Mitigation (From admission, onward)         Ordered     IP VTE HIGH RISK PATIENT  Once         08/25/22 1026     Place sequential compression device  Until discontinued         08/25/22 1026                Cheryl Marquez PA-C  Cardiology  [facility] - [facility] ([facility])

## 2022-08-26 NOTE — PLAN OF CARE
Patient remains free from falls and injuries this shift, patient has had no complaints of pain. Will continue to monitor patient. Chart check completed.

## 2022-08-26 NOTE — PLAN OF CARE
Pt remained free of falls this shift. No complaints of pain or discomfort. Medications administered as ordered. Hourly rounding completed. Pt instructed to call for assistance. POC reviewed. Pt verbalized understanding. Will continue to monitor.

## 2022-08-27 NOTE — DISCHARGE SUMMARY
[facility] - [facility] ([facility])  [facility] Medicine  Discharge Summary      Patient Name: Shahram London  MRN: [id number]  Patient Class: OP- Observation  Admission Date: 8/25/2022  Hospital Length of Stay: 0 days  Discharge Date and Time:  08/27/2022 4:28 PM  Attending Physician: No att. providers found   Discharging Provider: Froilan Butt MD  Primary Care Provider: Jimmy Montelongo MD      HPI:    Shahram London is a 77 y.o. male patient who presents to the Emergency Department for L-sided CP radiating to the back which onset gradually this morning    Troponin -0.072 , CXR  no acute process, ekg -nsr , incomplete RBBB     Admitted with NSTEMI -taken to [facility] today       Procedure(s) (LRB):  CATHETERIZATION, HEART, LEFT (Left)  ANGIOGRAM, CORONARY ARTERY (N/A)  Ventriculogram, Left      Hospital Course:   08/26 - Admitted with NSTEMI , [facility] 08/25/22  mild to moderate disease , preserved EF               Patient was also hyponatremic due to volume depletion which improved with IVF                Stable for discharge on BB, ASA, Statin         Goals of Care Treatment Preferences:  Code Status: Full Code      Consults:   Consults (From admission, onward)        Status Ordering Provider     Inpatient consult to Cardiology  Once        Provider:  Jacky Menard MD    Completed FROILAN BUTT          No new Assessment & Plan notes have been filed under this hospital service since the last note was generated.  Service: Hospital Medicine    Final Active Diagnoses:    Diagnosis Date Noted POA    Elevated troponin [R77.8] 08/26/2022 Yes    Chest pain [R07.9] 08/26/2022 Yes    Prediabetes [R73.03] 08/25/2022 Yes      Problems Resolved During this Admission:    Diagnosis Date Noted Date Resolved POA    PRINCIPAL PROBLEM:  NSTEMI (non-ST elevated myocardial infarction) [I21.4] 08/25/2022 08/26/2022 Yes       Discharged Condition: good    Disposition: Home or Self Care    Follow Up:    Patient Instructions:    No discharge procedures on file.    Significant Diagnostic Studies: Labs:   BMP:   Recent Labs   Lab 08/26/22  0503   *   *   K 4.5      CO2 21*   BUN 31*   CREATININE 1.1   CALCIUM 8.5*   , CMP   Recent Labs   Lab 08/26/22  0503   *   K 4.5      CO2 21*   *   BUN 31*   CREATININE 1.1   CALCIUM 8.5*   ANIONGAP 11    and CBC   Recent Labs   Lab 08/26/22  0503   WBC 9.58   HGB 12.8*   HCT 38.3*          Pending Diagnostic Studies:     None         Medications:  Reconciled Home Medications:      Medication List      START taking these medications    aspirin 81 MG Chew  Take 1 tablet (81 mg total) by mouth once daily.     atorvastatin 40 MG tablet  Commonly known as: LIPITOR  Take 1 tablet (40 mg total) by mouth every evening.     metoprolol succinate 25 MG 24 hr tablet  Commonly known as: TOPROL-XL  Take 0.5 tablets (12.5 mg total) by mouth once daily.        CONTINUE taking these medications    metFORMIN 500 MG tablet  Commonly known as: GLUCOPHAGE  Take 1 tablet (500 mg total) by mouth 2 (two) times daily.     ondansetron 4 MG Tbdl  Commonly known as: ZOFRAN-ODT  Take 8 mg by mouth every 8 (eight) hours as needed.            Indwelling Lines/Drains at time of discharge:   Lines/Drains/Airways     None                 Time spent on the discharge of patient:  40  minutes         Amy Butt MD  Department of Hospital Medicine  [facility] - Tele[facility] ([facility])

## 2022-09-13 ENCOUNTER — OFFICE VISIT (OUTPATIENT)
Dept: CARDIOLOGY | Facility: CLINIC | Age: 77
End: 2022-09-13
Payer: MEDICARE

## 2022-09-13 VITALS
SYSTOLIC BLOOD PRESSURE: 118 MMHG | WEIGHT: 209 LBS | BODY MASS INDEX: 28.31 KG/M2 | HEART RATE: 63 BPM | HEIGHT: 72 IN | DIASTOLIC BLOOD PRESSURE: 70 MMHG | OXYGEN SATURATION: 99 %

## 2022-09-13 DIAGNOSIS — E78.5 HYPERLIPIDEMIA, UNSPECIFIED HYPERLIPIDEMIA TYPE: ICD-10-CM

## 2022-09-13 DIAGNOSIS — I25.10 CORONARY ARTERY DISEASE INVOLVING NATIVE CORONARY ARTERY OF NATIVE HEART WITHOUT ANGINA PECTORIS: ICD-10-CM

## 2022-09-13 DIAGNOSIS — R73.03 PREDIABETES: Primary | ICD-10-CM

## 2022-09-13 PROCEDURE — 3078F PR MOST RECENT DIASTOLIC BLOOD PRESSURE < 80 MM HG: ICD-10-PCS | Mod: CPTII,S$GLB,, | Performed by: INTERNAL MEDICINE

## 2022-09-13 PROCEDURE — 1159F PR MEDICATION LIST DOCUMENTED IN MEDICAL RECORD: ICD-10-PCS | Mod: CPTII,S$GLB,, | Performed by: INTERNAL MEDICINE

## 2022-09-13 PROCEDURE — 99999 PR PBB SHADOW E&M-EST. PATIENT-LVL III: CPT | Mod: PBBFAC,,, | Performed by: INTERNAL MEDICINE

## 2022-09-13 PROCEDURE — 3074F PR MOST RECENT SYSTOLIC BLOOD PRESSURE < 130 MM HG: ICD-10-PCS | Mod: CPTII,S$GLB,, | Performed by: INTERNAL MEDICINE

## 2022-09-13 PROCEDURE — 99214 OFFICE O/P EST MOD 30 MIN: CPT | Mod: S$GLB,,, | Performed by: INTERNAL MEDICINE

## 2022-09-13 PROCEDURE — 1126F AMNT PAIN NOTED NONE PRSNT: CPT | Mod: CPTII,S$GLB,, | Performed by: INTERNAL MEDICINE

## 2022-09-13 PROCEDURE — 1159F MED LIST DOCD IN RCRD: CPT | Mod: CPTII,S$GLB,, | Performed by: INTERNAL MEDICINE

## 2022-09-13 PROCEDURE — 99999 PR PBB SHADOW E&M-EST. PATIENT-LVL III: ICD-10-PCS | Mod: PBBFAC,,, | Performed by: INTERNAL MEDICINE

## 2022-09-13 PROCEDURE — 3078F DIAST BP <80 MM HG: CPT | Mod: CPTII,S$GLB,, | Performed by: INTERNAL MEDICINE

## 2022-09-13 PROCEDURE — 1101F PT FALLS ASSESS-DOCD LE1/YR: CPT | Mod: CPTII,S$GLB,, | Performed by: INTERNAL MEDICINE

## 2022-09-13 PROCEDURE — 3074F SYST BP LT 130 MM HG: CPT | Mod: CPTII,S$GLB,, | Performed by: INTERNAL MEDICINE

## 2022-09-13 PROCEDURE — 3288F FALL RISK ASSESSMENT DOCD: CPT | Mod: CPTII,S$GLB,, | Performed by: INTERNAL MEDICINE

## 2022-09-13 PROCEDURE — 1101F PR PT FALLS ASSESS DOC 0-1 FALLS W/OUT INJ PAST YR: ICD-10-PCS | Mod: CPTII,S$GLB,, | Performed by: INTERNAL MEDICINE

## 2022-09-13 PROCEDURE — 99214 PR OFFICE/OUTPT VISIT, EST, LEVL IV, 30-39 MIN: ICD-10-PCS | Mod: S$GLB,,, | Performed by: INTERNAL MEDICINE

## 2022-09-13 PROCEDURE — 1126F PR PAIN SEVERITY QUANTIFIED, NO PAIN PRESENT: ICD-10-PCS | Mod: CPTII,S$GLB,, | Performed by: INTERNAL MEDICINE

## 2022-09-13 PROCEDURE — 3288F PR FALLS RISK ASSESSMENT DOCUMENTED: ICD-10-PCS | Mod: CPTII,S$GLB,, | Performed by: INTERNAL MEDICINE

## 2022-09-14 NOTE — PROGRESS NOTES
Subjective:   Patient ID:  Shahram London is a 77 y.o. male who presents for evaluation of No chief complaint on file.      HPI  76 yo male with h/o prediatbetes, htn, hld, ex smoker for very short time ?   Complaint on admission on recent hospitalization of chest/back pain radiation.  His troponin was mildly elevated.    He was taken to the cath lab found to have nonobstructive disease and the larger coronary vessels and ostial disease of an OM branch of small size  His right radial access looks normal today.  He denies any more chest pain.    His Lipitor was increased as well as started on Toprol half a pill along with aspirin daily.      History reviewed. No pertinent past medical history.    Past Surgical History:   Procedure Laterality Date    CORONARY ANGIOGRAPHY N/A 8/25/2022    Procedure: ANGIOGRAM, CORONARY ARTERY;  Surgeon: Jacky Menard MD;  Location: [facility] CATH LAB;  Service: Cardiology;  Laterality: N/A;    LEFT HEART CATHETERIZATION Left 8/25/2022    Procedure: CATHETERIZATION, HEART, LEFT;  Surgeon: Jacky Menard MD;  Location: [facility] CATH LAB;  Service: Cardiology;  Laterality: Left;       Social History     Tobacco Use    Smoking status: Former     Types: Cigarettes       History reviewed. No pertinent family history.    Review of Systems   Constitutional: Negative for fever and malaise/fatigue.   HENT:  Negative for sore throat.    Eyes:  Negative for blurred vision.   Cardiovascular:  Negative for chest pain, claudication, cyanosis, dyspnea on exertion, irregular heartbeat, leg swelling, near-syncope, orthopnea, palpitations, paroxysmal nocturnal dyspnea and syncope.   Respiratory:  Negative for cough and hemoptysis.    Hematologic/Lymphatic: Negative for bleeding problem.   Skin:  Negative for rash.   Musculoskeletal:  Negative for falls.   Gastrointestinal:  Negative for abdominal pain.   Genitourinary: Negative.    Neurological: Negative.    Psychiatric/Behavioral:  Negative for  altered mental status and substance abuse.      Current Outpatient Medications on File Prior to Visit   Medication Sig    aspirin 81 MG Chew Take 1 tablet (81 mg total) by mouth once daily.    atorvastatin (LIPITOR) 40 MG tablet Take 1 tablet (40 mg total) by mouth every evening.    metFORMIN (GLUCOPHAGE) 500 MG tablet Take 1 tablet (500 mg total) by mouth 2 (two) times daily.    metoprolol succinate (TOPROL-XL) 25 MG 24 hr tablet Take 0.5 tablets (12.5 mg total) by mouth once daily.    ondansetron (ZOFRAN-ODT) 4 MG TbDL Take 8 mg by mouth every 8 (eight) hours as needed.     No current facility-administered medications on file prior to visit.       Objective:   Objective:  Wt Readings from Last 3 Encounters:   09/13/22 94.8 kg (208 lb 15.9 oz)   08/26/22 97.4 kg (214 lb 11.7 oz)     Temp Readings from Last 3 Encounters:   08/26/22 97.2 °F (36.2 °C) (Oral)     BP Readings from Last 3 Encounters:   09/13/22 118/70   08/26/22 (!) 107/58     Pulse Readings from Last 3 Encounters:   09/13/22 63   08/26/22 62       Physical Exam  Vitals reviewed.   Constitutional:       Appearance: He is well-developed.   HENT:      Head: Normocephalic and atraumatic.   Eyes:      General: No scleral icterus.     Conjunctiva/sclera: Conjunctivae normal.   Cardiovascular:      Rate and Rhythm: Normal rate and regular rhythm.      Pulses: Intact distal pulses.      Heart sounds: Normal heart sounds. No murmur heard.  Pulmonary:      Effort: No respiratory distress.      Breath sounds: No wheezing or rales.   Chest:      Chest wall: No tenderness.   Abdominal:      General: Bowel sounds are normal. There is no distension.      Palpations: Abdomen is soft.      Tenderness: There is no guarding.   Musculoskeletal:         General: Normal range of motion.      Cervical back: Normal range of motion and neck supple.   Skin:     General: Skin is warm.   Neurological:      Mental Status: He is alert and oriented to person, place, and time.        Lab Results   Component Value Date    CHOL 130 08/26/2022     Lab Results   Component Value Date    HDL 18 (L) 08/26/2022     Lab Results   Component Value Date    LDLCALC 90.4 08/26/2022     Lab Results   Component Value Date    TRIG 108 08/26/2022     Lab Results   Component Value Date    CHOLHDL 13.8 (L) 08/26/2022       Chemistry        Component Value Date/Time     (L) 08/26/2022 0503    K 4.5 08/26/2022 0503     08/26/2022 0503    CO2 21 (L) 08/26/2022 0503    BUN 31 (H) 08/26/2022 0503    CREATININE 1.1 08/26/2022 0503     (H) 08/26/2022 0503        Component Value Date/Time    CALCIUM 8.5 (L) 08/26/2022 0503    ALKPHOS 54 (L) 08/25/2022 0905    AST 36 08/25/2022 0905    ALT 38 08/25/2022 0905    BILITOT 1.4 (H) 08/25/2022 0905          Lab Results   Component Value Date    TSH 2.4 01/14/2004     Lab Results   Component Value Date    INR 1.1 08/25/2022     Lab Results   Component Value Date    WBC 9.58 08/26/2022    HGB 12.8 (L) 08/26/2022    HCT 38.3 (L) 08/26/2022    MCV 93 08/26/2022     08/26/2022     BNP  @LABRCNTIP(BNP,BNPTRIAGEBLO)@  CrCl cannot be calculated (Patient's most recent lab result is older than the maximum 7 days allowed.).     Imaging:  ======  Results for orders placed during the hospital encounter of 08/25/22    Echo    Interpretation Summary  · The left ventricle is normal in size with concentric remodeling and normal systolic function.  · Normal left ventricular diastolic function.  · Normal right ventricular size with normal right ventricular systolic function.  · Normal central venous pressure (3 mmHg).  · The estimated ejection fraction is 55%.  · Mild aortic regurgitation.  · Mild tricuspid regurgitation.    .    Diagnostic Results:  ECG: Reviewed  Results for orders placed during the hospital encounter of 08/25/22    Cardiac catheterization    Conclusion  · The pre-procedure left ventricular end diastolic pressure was 12.  · The ejection fraction  was greater than 55% by visual estimate.  · 3 drc dropped into LV during coronary injection, EF normal  · The Lat 2nd Mrg lesion was 85% stenosed.  · The Mid Cx to Dist Cx lesion was 50% stenosed.  · The estimated blood loss was none.  · There was non-obstructive coronary artery disease..  · The filling pressures normal.    The procedure log was documented by Documenter: Pan Berrios RN and verified by Jacky Menard MD.    Date: 8/25/2022  Time: 6:55 PM    The ASCVD Risk score (Frances CRUZ, et al., 2019) failed to calculate for the following reasons:    The patient has a prior MI or stroke diagnosis    Assessment and Plan:   Prediabetes    Coronary artery disease involving native coronary artery of native heart without angina pectoris    Hyperlipidemia, unspecified hyperlipidemia type    BMI 28.0-28.9,adult    Reviewed all tests and above medical conditions with patient in detail and formulated treatment plan.  Risk factor modification discussed.   Cardiac low salt diet discussed.  Maintaining healthy weight and weight loss goals were discussed in clinic.  Reviewed cath films with the patient.    Continue with Lipitor, Toprol and aspirin daily  Follow up in 6 months

## 2025-02-07 DIAGNOSIS — M79.641 RIGHT HAND PAIN: Primary | ICD-10-CM

## 2025-02-13 ENCOUNTER — OFFICE VISIT (OUTPATIENT)
Dept: ORTHOPEDICS | Facility: CLINIC | Age: 80
End: 2025-02-13
Payer: MEDICARE

## 2025-02-13 DIAGNOSIS — G56.01 RIGHT CARPAL TUNNEL SYNDROME: Primary | ICD-10-CM

## 2025-02-13 PROCEDURE — 1159F MED LIST DOCD IN RCRD: CPT | Mod: CPTII,S$GLB,, | Performed by: ORTHOPAEDIC SURGERY

## 2025-02-13 PROCEDURE — 99999 PR PBB SHADOW E&M-EST. PATIENT-LVL II: CPT | Mod: PBBFAC,,, | Performed by: ORTHOPAEDIC SURGERY

## 2025-02-13 PROCEDURE — 1126F AMNT PAIN NOTED NONE PRSNT: CPT | Mod: CPTII,S$GLB,, | Performed by: ORTHOPAEDIC SURGERY

## 2025-02-13 PROCEDURE — 99204 OFFICE O/P NEW MOD 45 MIN: CPT | Mod: S$GLB,,, | Performed by: ORTHOPAEDIC SURGERY

## 2025-02-13 PROCEDURE — 1101F PT FALLS ASSESS-DOCD LE1/YR: CPT | Mod: CPTII,S$GLB,, | Performed by: ORTHOPAEDIC SURGERY

## 2025-02-13 PROCEDURE — 3288F FALL RISK ASSESSMENT DOCD: CPT | Mod: CPTII,S$GLB,, | Performed by: ORTHOPAEDIC SURGERY

## 2025-02-13 NOTE — PROGRESS NOTES
CIRA Hill M.D.  Orthopaedic Hand and Wrist Surgery  [address]    Patient ID: Shahram London  YOB: 1945  MRN: [id number]    Provider Note/Medical Decision Makin. Right carpal tunnel syndrome  Assessment & Plan:  The patient and I talked at length about the natural history and pathophysiology of severe right carpal tunnel syndrome, he understands that this is a chronic problem which may have acute episodic exacerbations.   Symptoms may resolve, worsen and even become permanent.  The patient understands the treatment options including observation, activity modification, therapy, NSAIDs, splints, injections and the surgical options including carpal tunnel release.  We discussed the risks of the diagnosis and the treatment options including pain, infection, bleeding, damage to nerves and vessels, stiffness, scarring, incomplete relief or recurrence of symptoms, poor pain and functional outcomes.  Unique risks of this diagnosis and the treatment include incomplete relief of symptoms and recurrence.  The patients treatment is further complicated by an increased risk of stiffness, wound healing complications and infection due to diabetes and an elevation of blood glucose levels in diabetic patients after steroid injections.  The patient has elected to proceed with right carpal tunnel release and we will follow up postop.             Chief Complaint: Numbness of the Right Hand      Referred By: Self,Aaareferral    History of Present Illness: Shahram London is a 80 y.o. male here today for evaluation of right-hand numbness and tingling.  We have performed a left carpal tunnel release about a year and a half ago he had similar symptoms of the right at that time and wanted to wait he reports his numbness is constant over his radial 3 and half digits he also has some weakness in his right hand it wakes him up every night now at about 3:00 a.m. he has tried braces he  has not tried injections in his right side.      Patient was queried and this is the extent of the patients current complaints today.    Past Medical History:     Estimated body mass index is 28.34 kg/m² as calculated from the following:    Height as of 9/13/22: 6' (1.829 m).    Weight as of 9/13/22: 94.8 kg (208 lb 15.9 oz).  History reviewed. No pertinent past medical history.  Past Surgical History:   Procedure Laterality Date    CORONARY ANGIOGRAPHY N/A 8/25/2022    Procedure: ANGIOGRAM, CORONARY ARTERY;  Surgeon: Jacky Menard MD;  Location: [facility] CATH LAB;  Service: Cardiology;  Laterality: N/A;    LEFT HEART CATHETERIZATION Left 8/25/2022    Procedure: CATHETERIZATION, HEART, LEFT;  Surgeon: Jacky Menard MD;  Location: [facility] CATH LAB;  Service: Cardiology;  Laterality: Left;     No family history on file.  Social History     Socioeconomic History    Marital status:    Tobacco Use    Smoking status: Former     Types: Cigarettes     Medication List with Changes/Refills   Current Medications    ASPIRIN 81 MG CHEW    Take 1 tablet (81 mg total) by mouth once daily.    ATORVASTATIN (LIPITOR) 40 MG TABLET    Take 1 tablet (40 mg total) by mouth every evening.    METFORMIN (GLUCOPHAGE) 500 MG TABLET    Take 1 tablet (500 mg total) by mouth 2 (two) times daily.    METOPROLOL SUCCINATE (TOPROL-XL) 25 MG 24 HR TABLET    Take 0.5 tablets (12.5 mg total) by mouth once daily.    ONDANSETRON (ZOFRAN-ODT) 4 MG TBDL    Take 8 mg by mouth every 8 (eight) hours as needed.     Review of patient's allergies indicates:   Allergen Reactions    Iodinated contrast media Other (See Comments)     UNKNOWN     ROS    Physical Exam:   GENERAL: Well appearing, appropriate for stated age, no acute distress.  CARDIOVASCULAR:  Fingers have good brisk refill and good turgor.   PULMONARY: Respirations are even and non-labored.  NEURO: Awake, alert, and oriented x 3.  PSYCH: Mood & affect are appropriate.  Ortho/SPM  Exam  Hand/Wrist Musculoskeletal Exam  Examination of the right hand  Atrophy in his thenar eminence  5/5 1st dorsal interosseous  3/5 abductor pollicis brevis  Two-point discrimination over the median nerve distribution in his greater than 15 mm ulnar-sided is 8 mm  Full range of motion of the hand  Positive Tinel's over the carpal tunnel  Negative Tinel's over the cubital tunnel  Negative elbow flexion test  Positive Phalen's      Provider Note/Medical Decision Makin. Right carpal tunnel syndrome  Assessment & Plan:  The patient and I talked at length about the natural history and pathophysiology of severe right carpal tunnel syndrome, he understands that this is a chronic problem which may have acute episodic exacerbations.   Symptoms may resolve, worsen and even become permanent.  The patient understands the treatment options including observation, activity modification, therapy, NSAIDs, splints, injections and the surgical options including carpal tunnel release.  We discussed the risks of the diagnosis and the treatment options including pain, infection, bleeding, damage to nerves and vessels, stiffness, scarring, incomplete relief or recurrence of symptoms, poor pain and functional outcomes.  Unique risks of this diagnosis and the treatment include incomplete relief of symptoms and recurrence.  The patients treatment is further complicated by an increased risk of stiffness, wound healing complications and infection due to diabetes and an elevation of blood glucose levels in diabetic patients after steroid injections.  The patient has elected to proceed with right carpal tunnel release and we will follow up postop.             I discussed worrisome and red flag signs and symptoms with the patient. The patient expressed understanding and agreed to alert me immediately or to go to the emergency room if they experience any of these.   Treatment plan was developed with input from the patient/family, and  they expressed understanding and agreement with the plan. All questions were answered today.    There are no Patient Instructions on file for this visit.    CIRA Hill M.D.  [facility]  Orthopedic Hand and Wrist Surgeon    [facility]  Dr. Reji Hill   [employer]     Disclaimer: This note was prepared using a voice recognition system and is likely to have sound alike errors within the text.

## 2025-02-13 NOTE — ASSESSMENT & PLAN NOTE
The patient and I talked at length about the natural history and pathophysiology of severe right carpal tunnel syndrome, he understands that this is a chronic problem which may have acute episodic exacerbations.   Symptoms may resolve, worsen and even become permanent.  The patient understands the treatment options including observation, activity modification, therapy, NSAIDs, splints, injections and the surgical options including carpal tunnel release.  We discussed the risks of the diagnosis and the treatment options including pain, infection, bleeding, damage to nerves and vessels, stiffness, scarring, incomplete relief or recurrence of symptoms, poor pain and functional outcomes.  Unique risks of this diagnosis and the treatment include incomplete relief of symptoms and recurrence.  The patients treatment is further complicated by an increased risk of stiffness, wound healing complications and infection due to diabetes and an elevation of blood glucose levels in diabetic patients after steroid injections.  The patient has elected to proceed with right carpal tunnel release and we will follow up postop.

## 2025-02-14 DIAGNOSIS — G56.01 RIGHT CARPAL TUNNEL SYNDROME: Primary | ICD-10-CM

## 2025-02-19 ENCOUNTER — E-CONSULT (OUTPATIENT)
Dept: CARDIOLOGY | Facility: CLINIC | Age: 80
End: 2025-02-19
Payer: MEDICARE

## 2025-02-19 DIAGNOSIS — Z01.810 PREOP CARDIOVASCULAR EXAM: Primary | ICD-10-CM

## 2025-02-19 NOTE — CONSULTS
[facility] - Cardiology  Response for E-Consult     Patient Name: Shahram London  MRN: [id number]  Primary Care Provider: Jimmy Montelongo MD   Requesting Provider: Priscilla Trejo*  E-Consult to General Cardiology  Consult performed by: Jacky Menard MD  Consult ordered by: Priscilla Trejo, SHARMIN  Reason for consult: Preop          Recommendation:  Okay to proceed with low risk carpal tunnel surgery from cardiac standpoint if angina free.    Continue with metoprolol perioperatively.    Okay to hold aspirin for 5 days prior      Additional future steps to consider:  Follow-up with cardiology as needed    Total time of Consultation: 5 minute    I did not speak to the requesting provider verbally about this.     *This eConsult is based on the clinical data available to me and is furnished without benefit of a physical examination. The eConsult will need to be interpreted in light of any clinical issues or changes in patient status not available to me at the time of filing this eConsults. Significant changes in patient condition or level of acuity should result in immediate formal consultation and reevaluation. Please alert me if you have further questions.    Thank you for this eConsult referral.     Jacky Menard MD  [facility] - Cardiology

## 2025-02-20 PROBLEM — D69.6 THROMBOCYTOPENIA: Status: ACTIVE | Noted: 2025-02-20

## 2025-02-20 PROBLEM — I25.10 CAD (CORONARY ARTERY DISEASE): Status: ACTIVE | Noted: 2025-02-20

## 2025-02-20 NOTE — PROGRESS NOTES
Preoperative History and Physical                                                                                                  Chief Complaint: Preoperative evaluation     History of Present Illness:      Shahram London is a 80 y.o. male who presents to the office today for a preoperative consultation at the request of Dr. Hill  who plans on performing R CTR on February 28.     Functional Status:      The patient is able to climb a flight of stairs. The patient is able to ambulate  without difficulty. The patient's functional status is not affected by the surgical problem. The patient's functional status is not affected by shortness of breath, chest pain, dyspnea on exertion and fatigue.  Pt active around the house, patient can push mower and weed eat  no chest pain or pressure no shortness of breath with activity  MET score greater than 4    Patient Anesthesia History:      History of Malignant Hyperthermia: no  History of Pseudocholinesterase Deficiency: no  History PONV: no  History of difficult intubation: no  History of delayed emergence: no    Family Anesthesia History:      History of Malignant Hyperthermia: no  History of Pseudocholinesterase Deficiency: no     Past Medical History:      Past Medical History:   Diagnosis Date    Cancer         Past Surgical History:      Past Surgical History:   Procedure Laterality Date    CORONARY ANGIOGRAPHY N/A 08/25/2022    Procedure: ANGIOGRAM, CORONARY ARTERY;  Surgeon: Jacky Menard MD;  Location: [facility] CATH LAB;  Service: Cardiology;  Laterality: N/A;    LEFT HEART CATHETERIZATION Left 08/25/2022    Procedure: CATHETERIZATION, HEART, LEFT;  Surgeon: Jacky Menard MD;  Location: [facility] CATH LAB;  Service: Cardiology;  Laterality: Left;    PROSTATECTOMY      TONSILLECTOMY          Social History:      Social History     Socioeconomic History    Marital status:    Tobacco Use    Smoking status: Former     Types: Cigarettes    Tobacco comments:      H/o occasional cigarettes   Substance and Sexual Activity    Alcohol use: Yes     Comment: occ beer    Drug use: Not Currently        Family History:      Family History   Problem Relation Name Age of Onset    Diabetes Father         Allergies:      Review of patient's allergies indicates:   Allergen Reactions    Iodinated contrast media Other (See Comments)     UNKNOWN       Medications:      Current Outpatient Medications   Medication Sig    aspirin 81 MG Chew Take 1 tablet (81 mg total) by mouth once daily.    atorvastatin (LIPITOR) 40 MG tablet Take 1 tablet (40 mg total) by mouth every evening.    metFORMIN (GLUCOPHAGE) 500 MG tablet Take 1 tablet (500 mg total) by mouth 2 (two) times daily.    metoprolol succinate (TOPROL-XL) 25 MG 24 hr tablet Take 0.5 tablets (12.5 mg total) by mouth once daily.    ondansetron (ZOFRAN-ODT) 4 MG TbDL Take 8 mg by mouth every 8 (eight) hours as needed. (Patient not taking: Reported on 2/25/2025)     No current facility-administered medications for this visit.       Vitals:      Vitals:    02/25/25 1027   BP: 137/74   Pulse: (!) 54   Resp: 17   Temp: 97.5 °F (36.4 °C)       Review of Systems:        Review of Systems   Constitutional:  Negative for chills, fever, malaise/fatigue and weight loss.   HENT:  Positive for hearing loss and tinnitus. Negative for congestion and ear discharge.    Eyes:  Negative for pain and discharge.   Respiratory:  Negative for cough, shortness of breath and wheezing.    Cardiovascular:  Negative for chest pain, palpitations and leg swelling.   Gastrointestinal:  Negative for abdominal pain, heartburn, nausea and vomiting.   Genitourinary:  Negative for dysuria, frequency and urgency.   Musculoskeletal:  Positive for joint pain (R wrist pain). Negative for back pain and neck pain.   Skin:  Negative for itching and rash.   Neurological:  Negative for dizziness, tingling and headaches.   Endo/Heme/Allergies:  Negative for environmental allergies.    Psychiatric/Behavioral:  Negative for depression.          Physical Exam:      Constitutional: Appears well-developed, well-nourished and in no acute distress.  Patient is oriented to person, place, and time.   Head: Normocephalic and atraumatic. Mucous membranes moist.  Neck: Neck supple no mass.   Cardiovascular: bradycardic  and regular rhythm.  S1 S2 appreciated by ascultation.  Pulmonary/Chest: Effort normal and clear to auscultation bilaterally. No respiratory distress.   Abdomen:  non-distended.   Neurological: Patient is alert and oriented to person, place and time. Moves all extremities.  Skin: Warm and dry. No lesions.  Extremities: No clubbing, cyanosis or edema.    Laboratory data:      Reviewed and noted in plan where applicable. Please see chart for full laboratory data.    @TJUMFLGRV28(cpk,cpkmb,troponini,mb)@ @UPOBBPLCX25(poctglucose)@     Lab Results   Component Value Date    INR 1.1 08/25/2022       Lab Results   Component Value Date    WBC 7.27 02/25/2025    HGB 15.8 02/25/2025    HCT 45.6 02/25/2025    MCV 93 02/25/2025     (L) 02/25/2025       @XTYUWMSVT19(GLU,NA,K,Cl,CO2,BUN,Creatinine,Calcium,MG)@    Predictors of intubation difficulty:       Morbid obesity? no   Anatomically abnormal facies? no   Prominent incisors? no   Receding mandible? no   Short, thick neck? no   Neck range of motion: normal   Dentition:  implants  Based on the Modified Mallampati, patient is a mallampati score: II (hard and soft palate, upper portion of tonsils anduvula visible)    Cardiographics:      ECG: EKG pending 2/25/25  Echocardiogram:  2022  The left ventricle is normal in size with concentric remodeling and normal systolic function.  Normal left ventricular diastolic function.  Normal right ventricular size with normal right ventricular systolic function.  Normal central venous pressure (3 mmHg).  The estimated ejection fraction is 55%.  Mild aortic regurgitation.  Mild tricuspid  regurgitation.  Imaging:      Chest x-ray:  none      Assessment and Plan:      Right carpal tunnel syndrome  Patient presents at the request of Dr. Hill who plans on performing a carpal tunnel release on the right on February 28th  Known risk factors for perioperative complications:  pre DM     Difficulty with intubation is not anticipated.  No prior airway history for review    Cardiac Risk Estimation:  Per Revised Cardiac Risk Index patient 's RCRI score is 0 with a 3.9% risk of a major cardiac event.      1.) Preoperative workup as follows: ECG, hemoglobin, hematocrit, electrolytes, creatinine, glucose, liver function studies.  2.) Change in medication regimen before surgery:  Patient has aspirin on hold in anticipation of surgery, hold metformin 24 hours prior to the procedure .  3.) Prophylaxis for cardiac events with perioperative beta-blockers:  Continue metoprolol perioperatively.  4.) Invasive hemodynamic monitoring perioperatively: not indicated.  5.) Deep vein thrombosis prophylaxis postoperatively: intermittent pneumatic compression boots and regimen to be chosen by surgical team.  6.) Surveillance for postoperative MI with ECG immediately postoperatively and on postoperati ve days 1 and 2 AND troponin levels 24 hours postoperatively and on day 4 or hospital discharge (whichever comes first): not indicated.  7.) Current medications which may produce withdrawal symptoms if withheld perioperatively: none  8.) Other measures:  EKG read per cards pending.  Patient has cardiac clearance per E consult. Fall early February , saw PCP with hip X ray. Pt was in attic hit his head on rafter and fell between the 2x6 boards. No LOC. No nystagmus on exam. Pt reports healing bruise to L hip.         CAD (coronary artery disease)  Cath 2022  The pre-procedure left ventricular end diastolic pressure was 12.  The ejection fraction was greater than 55% by visual estimate.  3 drc dropped into LV during coronary injection,  EF normal  The Lat 2nd Mrg lesion was 85% stenosed.  The Mid Cx to Dist Cx lesion was 50% stenosed.  The estimated blood loss was none.  There was non-obstructive coronary artery disease..  The filling pressures normal.    Prediabetes  On metformin, hold 24 hours prior to surgery  A1c in June was 6.3    Thrombocytopenia  H/o labs pending           Electronically signed   ENZO Trejo PA-C

## 2025-02-20 NOTE — H&P (VIEW-ONLY)
Preoperative History and Physical                                                                                                  Chief Complaint: Preoperative evaluation     History of Present Illness:      Shahram London is a 80 y.o. male who presents to the office today for a preoperative consultation at the request of Dr. Hill  who plans on performing R CTR on February 28.     Functional Status:      The patient is able to climb a flight of stairs. The patient is able to ambulate  without difficulty. The patient's functional status is not affected by the surgical problem. The patient's functional status is not affected by shortness of breath, chest pain, dyspnea on exertion and fatigue.  Pt active around the house, patient can push mower and weed eat  no chest pain or pressure no shortness of breath with activity  MET score greater than 4    Patient Anesthesia History:      History of Malignant Hyperthermia: no  History of Pseudocholinesterase Deficiency: no  History PONV: no  History of difficult intubation: no  History of delayed emergence: no    Family Anesthesia History:      History of Malignant Hyperthermia: no  History of Pseudocholinesterase Deficiency: no     Past Medical History:      Past Medical History:   Diagnosis Date    Cancer         Past Surgical History:      Past Surgical History:   Procedure Laterality Date    CORONARY ANGIOGRAPHY N/A 08/25/2022    Procedure: ANGIOGRAM, CORONARY ARTERY;  Surgeon: Jacky Menard MD;  Location: [facility] CATH LAB;  Service: Cardiology;  Laterality: N/A;    LEFT HEART CATHETERIZATION Left 08/25/2022    Procedure: CATHETERIZATION, HEART, LEFT;  Surgeon: Jacky Menard MD;  Location: [facility] CATH LAB;  Service: Cardiology;  Laterality: Left;    PROSTATECTOMY      TONSILLECTOMY          Social History:      Social History     Socioeconomic History    Marital status:    Tobacco Use    Smoking status: Former     Types: Cigarettes    Tobacco comments:      H/o occasional cigarettes   Substance and Sexual Activity    Alcohol use: Yes     Comment: occ beer    Drug use: Not Currently        Family History:      Family History   Problem Relation Name Age of Onset    Diabetes Father         Allergies:      Review of patient's allergies indicates:   Allergen Reactions    Iodinated contrast media Other (See Comments)     UNKNOWN       Medications:      Current Outpatient Medications   Medication Sig    aspirin 81 MG Chew Take 1 tablet (81 mg total) by mouth once daily.    atorvastatin (LIPITOR) 40 MG tablet Take 1 tablet (40 mg total) by mouth every evening.    metFORMIN (GLUCOPHAGE) 500 MG tablet Take 1 tablet (500 mg total) by mouth 2 (two) times daily.    metoprolol succinate (TOPROL-XL) 25 MG 24 hr tablet Take 0.5 tablets (12.5 mg total) by mouth once daily.    ondansetron (ZOFRAN-ODT) 4 MG TbDL Take 8 mg by mouth every 8 (eight) hours as needed. (Patient not taking: Reported on 2/25/2025)     No current facility-administered medications for this visit.       Vitals:      Vitals:    02/25/25 1027   BP: 137/74   Pulse: (!) 54   Resp: 17   Temp: 97.5 °F (36.4 °C)       Review of Systems:        Review of Systems   Constitutional:  Negative for chills, fever, malaise/fatigue and weight loss.   HENT:  Positive for hearing loss and tinnitus. Negative for congestion and ear discharge.    Eyes:  Negative for pain and discharge.   Respiratory:  Negative for cough, shortness of breath and wheezing.    Cardiovascular:  Negative for chest pain, palpitations and leg swelling.   Gastrointestinal:  Negative for abdominal pain, heartburn, nausea and vomiting.   Genitourinary:  Negative for dysuria, frequency and urgency.   Musculoskeletal:  Positive for joint pain (R wrist pain). Negative for back pain and neck pain.   Skin:  Negative for itching and rash.   Neurological:  Negative for dizziness, tingling and headaches.   Endo/Heme/Allergies:  Negative for environmental allergies.    Psychiatric/Behavioral:  Negative for depression.          Physical Exam:      Constitutional: Appears well-developed, well-nourished and in no acute distress.  Patient is oriented to person, place, and time.   Head: Normocephalic and atraumatic. Mucous membranes moist.  Neck: Neck supple no mass.   Cardiovascular: bradycardic  and regular rhythm.  S1 S2 appreciated by ascultation.  Pulmonary/Chest: Effort normal and clear to auscultation bilaterally. No respiratory distress.   Abdomen:  non-distended.   Neurological: Patient is alert and oriented to person, place and time. Moves all extremities.  Skin: Warm and dry. No lesions.  Extremities: No clubbing, cyanosis or edema.    Laboratory data:      Reviewed and noted in plan where applicable. Please see chart for full laboratory data.    @FAWITRAGX72(cpk,cpkmb,troponini,mb)@ @OUVCRQCFB87(poctglucose)@     Lab Results   Component Value Date    INR 1.1 08/25/2022       Lab Results   Component Value Date    WBC 7.27 02/25/2025    HGB 15.8 02/25/2025    HCT 45.6 02/25/2025    MCV 93 02/25/2025     (L) 02/25/2025       @KKITBWQFP92(GLU,NA,K,Cl,CO2,BUN,Creatinine,Calcium,MG)@    Predictors of intubation difficulty:       Morbid obesity? no   Anatomically abnormal facies? no   Prominent incisors? no   Receding mandible? no   Short, thick neck? no   Neck range of motion: normal   Dentition:  implants  Based on the Modified Mallampati, patient is a mallampati score: II (hard and soft palate, upper portion of tonsils anduvula visible)    Cardiographics:      ECG: EKG pending 2/25/25  Echocardiogram:  2022  The left ventricle is normal in size with concentric remodeling and normal systolic function.  Normal left ventricular diastolic function.  Normal right ventricular size with normal right ventricular systolic function.  Normal central venous pressure (3 mmHg).  The estimated ejection fraction is 55%.  Mild aortic regurgitation.  Mild tricuspid  regurgitation.  Imaging:      Chest x-ray:  none      Assessment and Plan:      Right carpal tunnel syndrome  Patient presents at the request of Dr. Hill who plans on performing a carpal tunnel release on the right on February 28th  Known risk factors for perioperative complications:  pre DM     Difficulty with intubation is not anticipated.  No prior airway history for review    Cardiac Risk Estimation:  Per Revised Cardiac Risk Index patient 's RCRI score is 0 with a 3.9% risk of a major cardiac event.      1.) Preoperative workup as follows: ECG, hemoglobin, hematocrit, electrolytes, creatinine, glucose, liver function studies.  2.) Change in medication regimen before surgery:  Patient has aspirin on hold in anticipation of surgery, hold metformin 24 hours prior to the procedure .  3.) Prophylaxis for cardiac events with perioperative beta-blockers:  Continue metoprolol perioperatively.  4.) Invasive hemodynamic monitoring perioperatively: not indicated.  5.) Deep vein thrombosis prophylaxis postoperatively: intermittent pneumatic compression boots and regimen to be chosen by surgical team.  6.) Surveillance for postoperative MI with ECG immediately postoperatively and on postoperati ve days 1 and 2 AND troponin levels 24 hours postoperatively and on day 4 or hospital discharge (whichever comes first): not indicated.  7.) Current medications which may produce withdrawal symptoms if withheld perioperatively: none  8.) Other measures:  EKG read per cards pending.  Patient has cardiac clearance per E consult. Fall early February , saw PCP with hip X ray. Pt was in attic hit his head on rafter and fell between the 2x6 boards. No LOC. No nystagmus on exam. Pt reports healing bruise to L hip.         CAD (coronary artery disease)  Cath 2022  The pre-procedure left ventricular end diastolic pressure was 12.  The ejection fraction was greater than 55% by visual estimate.  3 drc dropped into LV during coronary injection,  EF normal  The Lat 2nd Mrg lesion was 85% stenosed.  The Mid Cx to Dist Cx lesion was 50% stenosed.  The estimated blood loss was none.  There was non-obstructive coronary artery disease..  The filling pressures normal.    Prediabetes  On metformin, hold 24 hours prior to surgery  A1c in June was 6.3    Thrombocytopenia  H/o labs pending           Electronically signed   ENZO Trejo PA-C

## 2025-02-20 NOTE — ASSESSMENT & PLAN NOTE
Patient presents at the request of Dr. Hill who plans on performing a carpal tunnel release on the right on February 28th  Known risk factors for perioperative complications:  pre DM     Difficulty with intubation is not anticipated.  No prior airway history for review    Cardiac Risk Estimation:  Per Revised Cardiac Risk Index patient 's RCRI score is 0 with a 3.9% risk of a major cardiac event.      1.) Preoperative workup as follows: ECG, hemoglobin, hematocrit, electrolytes, creatinine, glucose, liver function studies.  2.) Change in medication regimen before surgery:  Patient has aspirin on hold in anticipation of surgery, hold metformin 24 hours prior to the procedure .  3.) Prophylaxis for cardiac events with perioperative beta-blockers:  Continue metoprolol perioperatively.  4.) Invasive hemodynamic monitoring perioperatively: not indicated.  5.) Deep vein thrombosis prophylaxis postoperatively: intermittent pneumatic compression boots and regimen to be chosen by surgical team.  6.) Surveillance for postoperative MI with ECG immediately postoperatively and on postoperati ve days 1 and 2 AND troponin levels 24 hours postoperatively and on day 4 or hospital discharge (whichever comes first): not indicated.  7.) Current medications which may produce withdrawal symptoms if withheld perioperatively: none  8.) Other measures:  EKG read per cards pending.  Patient has cardiac clearance per E consult. Fall early February , saw PCP with hip X ray. Pt was in attic hit his head on rafter and fell between the 2x6 boards. No LOC. No nystagmus on exam. Pt reports healing bruise to L hip.

## 2025-02-20 NOTE — ASSESSMENT & PLAN NOTE
Cath 2022  The pre-procedure left ventricular end diastolic pressure was 12.  The ejection fraction was greater than 55% by visual estimate.  3 drc dropped into LV during coronary injection, EF normal  The Lat 2nd Mrg lesion was 85% stenosed.  The Mid Cx to Dist Cx lesion was 50% stenosed.  The estimated blood loss was none.  There was non-obstructive coronary artery disease..  The filling pressures normal.

## 2025-02-25 ENCOUNTER — LAB VISIT (OUTPATIENT)
Dept: LAB | Facility: HOSPITAL | Age: 80
End: 2025-02-25
Attending: PHYSICIAN ASSISTANT
Payer: MEDICARE

## 2025-02-25 ENCOUNTER — OFFICE VISIT (OUTPATIENT)
Dept: INTERNAL MEDICINE | Facility: CLINIC | Age: 80
End: 2025-02-25
Payer: MEDICARE

## 2025-02-25 ENCOUNTER — TELEPHONE (OUTPATIENT)
Dept: PREADMISSION TESTING | Facility: HOSPITAL | Age: 80
End: 2025-02-25
Payer: MEDICARE

## 2025-02-25 VITALS
HEART RATE: 54 BPM | OXYGEN SATURATION: 100 % | RESPIRATION RATE: 17 BRPM | DIASTOLIC BLOOD PRESSURE: 74 MMHG | TEMPERATURE: 98 F | SYSTOLIC BLOOD PRESSURE: 137 MMHG

## 2025-02-25 DIAGNOSIS — R73.03 PRE-DIABETES: ICD-10-CM

## 2025-02-25 DIAGNOSIS — I25.10 CORONARY ARTERY DISEASE, UNSPECIFIED VESSEL OR LESION TYPE, UNSPECIFIED WHETHER ANGINA PRESENT, UNSPECIFIED WHETHER NATIVE OR TRANSPLANTED HEART: Primary | ICD-10-CM

## 2025-02-25 DIAGNOSIS — Z01.818 PRE-OP EXAM: ICD-10-CM

## 2025-02-25 DIAGNOSIS — D69.6 THROMBOCYTOPENIA: ICD-10-CM

## 2025-02-25 DIAGNOSIS — R73.03 PREDIABETES: ICD-10-CM

## 2025-02-25 DIAGNOSIS — G56.01 RIGHT CARPAL TUNNEL SYNDROME: ICD-10-CM

## 2025-02-25 LAB
ALBUMIN SERPL BCP-MCNC: 4 G/DL (ref 3.5–5.2)
ALP SERPL-CCNC: 66 U/L (ref 40–150)
ALT SERPL W/O P-5'-P-CCNC: 24 U/L (ref 10–44)
ANION GAP SERPL CALC-SCNC: 7 MMOL/L (ref 8–16)
AST SERPL-CCNC: 20 U/L (ref 10–40)
BILIRUB SERPL-MCNC: 0.8 MG/DL (ref 0.1–1)
BUN SERPL-MCNC: 22 MG/DL (ref 8–23)
CALCIUM SERPL-MCNC: 10.2 MG/DL (ref 8.7–10.5)
CHLORIDE SERPL-SCNC: 104 MMOL/L (ref 95–110)
CO2 SERPL-SCNC: 26 MMOL/L (ref 23–29)
CREAT SERPL-MCNC: 1.2 MG/DL (ref 0.5–1.4)
ERYTHROCYTE [DISTWIDTH] IN BLOOD BY AUTOMATED COUNT: 13.1 % (ref 11.5–14.5)
EST. GFR  (NO RACE VARIABLE): >60 ML/MIN/1.73 M^2
ESTIMATED AVG GLUCOSE: 134 MG/DL (ref 68–131)
GLUCOSE SERPL-MCNC: 137 MG/DL (ref 70–110)
HBA1C MFR BLD: 6.3 % (ref 4–5.6)
HCT VFR BLD AUTO: 45.6 % (ref 40–54)
HGB BLD-MCNC: 15.8 G/DL (ref 14–18)
MCH RBC QN AUTO: 32.2 PG (ref 27–31)
MCHC RBC AUTO-ENTMCNC: 34.6 G/DL (ref 32–36)
MCV RBC AUTO: 93 FL (ref 82–98)
OHS QRS DURATION: 120 MS
OHS QTC CALCULATION: 428 MS
PLATELET # BLD AUTO: 132 K/UL (ref 150–450)
PMV BLD AUTO: 8.7 FL (ref 9.2–12.9)
POTASSIUM SERPL-SCNC: 4.7 MMOL/L (ref 3.5–5.1)
PROT SERPL-MCNC: 7 G/DL (ref 6–8.4)
RBC # BLD AUTO: 4.9 M/UL (ref 4.6–6.2)
SODIUM SERPL-SCNC: 137 MMOL/L (ref 136–145)
WBC # BLD AUTO: 7.27 K/UL (ref 3.9–12.7)

## 2025-02-25 PROCEDURE — 93010 ELECTROCARDIOGRAM REPORT: CPT | Mod: S$GLB,,, | Performed by: INTERNAL MEDICINE

## 2025-02-25 PROCEDURE — 99999 PR PBB SHADOW E&M-EST. PATIENT-LVL III: CPT | Mod: PBBFAC,,,

## 2025-02-25 PROCEDURE — 85027 COMPLETE CBC AUTOMATED: CPT | Performed by: PHYSICIAN ASSISTANT

## 2025-02-25 PROCEDURE — 83036 HEMOGLOBIN GLYCOSYLATED A1C: CPT | Performed by: PHYSICIAN ASSISTANT

## 2025-02-25 PROCEDURE — 80053 COMPREHEN METABOLIC PANEL: CPT | Performed by: PHYSICIAN ASSISTANT

## 2025-02-25 PROCEDURE — 93005 ELECTROCARDIOGRAM TRACING: CPT

## 2025-02-25 NOTE — TELEPHONE ENCOUNTER
To confirm, your doctor has instructed you: Surgery is scheduled for 2/28/25.   Pre admit office will call the afternoon prior to surgery between 1PM and 3PM with arrival time.    Surgery will be at [facility],  The address is [address]. [place] [address].      IMPORTANT INSTRUCTIONS!    Do not eat or drink after 12 midnight, including water. Do not smoke or use chewing tobacco after 12 midnight!  OK to brush teeth, but no gum, candy, or mints!      *Take only these medicines with a small swallow of water-morning of surgery*     Metoprolol, atorvastatin        ____ Stop taking all vitamins, herbal supplements, Aspirin, & NSAIDS (Ibuprofen, Advil, Aleve) 7 days prior to surgery, as these can thin your blood.      *Diabetic Patients: If you take diabetic or weight loss medication, do NOT take morning of surgery unless instructed by Doctor.   Metformin to be stopped 24 hrs prior to surgery. DO NOT take short-acting insulin the day of surgery. Only take       Please notify MD office if you develop an active infection, are prescribed antibiotics by someone other than the surgeon doing your surgery, or visit urgent care/ER.    Bathing Instructions:   The night before surgery and the morning prior to coming to the hospital:    - Shower & rinse your body as usual with anti-bacterial Soap (Dial or Lever 2000)   -Hibiclens (if indicated) use AFTER anti-bacterial soap; 1 packet PM/1 packet in AM on surgical site only   -Do not use hibiclens on your head, face, or genitals.    -Do not wash with anti-bacterial soap after you use the hibiclens.    -Do not shave surgical site 5-7 days prior to surgery.    -Pubic hair 7 days prior to surgery (OBGYN/Urology only).       Additional Instructions:   __ No makeup, powder, lotions, creams, or body spray to skin   __ No deodorant if you are having: breast procedure, PORT insertion, or shoulder surgery!   __ No nail polish or artificial nails due to risk of  infection.             **SURGERY MAY BE CANCELLED AT SURGEON'S DISCRETION IF ARTIFICIAL/NAIL POLISH IS PRESENT!!!**  __ Please remove all piercings and leave all jewelry at home.    **SURGERY WILL BE CANCELLED IF PIERCINGS ARE PRESENT!!!**    __ Dentures, Hearing Aids and Contact Lens need to be removed prior to the start of surgery.    __ Avoid Alcoholic beverages 3 days prior to surgery, as it can thin the blood, unless told otherwise by pre-admit department.  __ Males: Stop ED medications (Viagra, Cialis) 24 hrs prior to surgery.  __ You must have transportation, and they MUST stay the entire time.   __  Bring photo ID and insurance information to hospital    What to Wear:  Clean, loose-fitting clothing. Please allow for dressings/bandages/surgical equipment/drains.       [facility] Visitor/Ride Policy:    Only 1 adult allowed (18 or older) to accompany you and MUST STAY through the entire admission length.      -Must have a ride home from a responsible adult that you know and trust.     -Medical Transport, Uber or Lyft can only be used if patient has a responsible adult to   accompany them during ride home.      ~Your ride MUST STAY the entire time until you are discharged~   Please notify the pre-admit department prior to surgery if you use medication transportation so we can verify your arrival/pickup time!   -The patient is responsible for setting up their own transportation!    Discharge Prescriptions:  Your discharge prescriptions will be sent next door to [facility] pharmacy, unless otherwise discussed with your surgeon. Your  will be responsible for calling the pharmacy ([phone]) to begin the prescription filling process. They will receive a text message with instructions once you are in recovery. Please make sure we have your insurance information and you bring a payment method (cash or card) if needed for prescriptions. If you have  insurance, please let the pre-op nurse know, as the  pharmacy does not take this insurance.     *If you are running late or have questions the morning of surgery, please call the Pre-OP Department @ [phone].       *Please Call [facility] Pre-Admit Department for surgery instruction questions:   [phone]     Payment Questions:                Billing Question Numbers:         [phone]

## 2025-02-25 NOTE — DISCHARGE INSTRUCTIONS
To confirm, your doctor has instructed you: Surgery is scheduled for 2/28/25.   Pre admit office will call the afternoon prior to surgery between 1PM and 3PM with arrival time.    Surgery will be at [facility],  The address is [address]. [place] [address].      IMPORTANT INSTRUCTIONS!    Do not eat or drink after 12 midnight, including water. Do not smoke or use chewing tobacco after 12 midnight!  OK to brush teeth, but no gum, candy, or mints!      *Take only these medicines with a small swallow of water-morning of surgery*     Metoprolol, atorvastatin        ____ Stop taking all vitamins, herbal supplements, Aspirin, & NSAIDS (Ibuprofen, Advil, Aleve) 7 days prior to surgery, as these can thin your blood.      *Diabetic Patients: If you take diabetic or weight loss medication, do NOT take morning of surgery unless instructed by Doctor.   Metformin to be stopped 24 hrs prior to surgery. DO NOT take short-acting insulin the day of surgery. Only take       Please notify MD office if you develop an active infection, are prescribed antibiotics by someone other than the surgeon doing your surgery, or visit urgent care/ER.    Bathing Instructions:   The night before surgery and the morning prior to coming to the hospital:    - Shower & rinse your body as usual with anti-bacterial Soap (Dial or Lever 2000)   -Hibiclens (if indicated) use AFTER anti-bacterial soap; 1 packet PM/1 packet in AM on surgical site only   -Do not use hibiclens on your head, face, or genitals.    -Do not wash with anti-bacterial soap after you use the hibiclens.    -Do not shave surgical site 5-7 days prior to surgery.    -Pubic hair 7 days prior to surgery (OBGYN/Urology only).       Additional Instructions:   __ No makeup, powder, lotions, creams, or body spray to skin   __ No deodorant if you are having: breast procedure, PORT insertion, or shoulder surgery!   __ No nail polish or artificial nails due to risk of  infection.             **SURGERY MAY BE CANCELLED AT SURGEON'S DISCRETION IF ARTIFICIAL/NAIL POLISH IS PRESENT!!!**  __ Please remove all piercings and leave all jewelry at home.    **SURGERY WILL BE CANCELLED IF PIERCINGS ARE PRESENT!!!**    __ Dentures, Hearing Aids and Contact Lens need to be removed prior to the start of surgery.    __ Avoid Alcoholic beverages 3 days prior to surgery, as it can thin the blood, unless told otherwise by pre-admit department.  __ Males: Stop ED medications (Viagra, Cialis) 24 hrs prior to surgery.  __ You must have transportation, and they MUST stay the entire time.   __  Bring photo ID and insurance information to hospital    What to Wear:  Clean, loose-fitting clothing. Please allow for dressings/bandages/surgical equipment/drains.       [facility]:    Only 1 adult allowed (18 or older) to accompany you and MUST STAY through the entire admission length.      -Must have a ride home from a responsible adult that you know and trust.     -Medical Transport, Uber or Lyft can only be used if patient has a responsible adult to   accompany them during ride home.      ~Your ride MUST STAY the entire time until you are discharged~   Please notify the pre-admit department prior to surgery if you use medication transportation so we can verify your arrival/pickup time!   -The patient is responsible for setting up their own transportation!    Discharge Prescriptions:  Your discharge prescriptions will be sent next door to [facility] pharmacy, unless otherwise discussed with your surgeon. Your  will be responsible for calling the pharmacy ([phone]) to begin the prescription filling process. They will receive a text message with instructions once you are in recovery. Please make sure we have your insurance information and you bring a payment method (cash or card) if needed for prescriptions. If you have  insurance, please let the pre-op nurse know, as the  pharmacy does not take this insurance.     *If you are running late or have questions the morning of surgery, please call the Pre-OP Department @ [phone].       *Please Call [facility] Pre-Admit Department for surgery instruction questions:   [phone]     Payment Questions:                Billing Question Numbers:         [phone]

## 2025-02-26 ENCOUNTER — ANESTHESIA EVENT (OUTPATIENT)
Dept: SURGERY | Facility: HOSPITAL | Age: 80
End: 2025-02-26
Payer: MEDICARE

## 2025-02-26 RX ORDER — SODIUM CHLORIDE, SODIUM LACTATE, POTASSIUM CHLORIDE, CALCIUM CHLORIDE 600; 310; 30; 20 MG/100ML; MG/100ML; MG/100ML; MG/100ML
INJECTION, SOLUTION INTRAVENOUS CONTINUOUS
OUTPATIENT
Start: 2025-02-26

## 2025-02-26 NOTE — ANESTHESIA PREPROCEDURE EVALUATION
02/26/2025  Shahram London is a 80 y.o., male.  Past Medical History:   Diagnosis Date    Cancer      Past Surgical History:   Procedure Laterality Date    CORONARY ANGIOGRAPHY N/A 08/25/2022    Procedure: ANGIOGRAM, CORONARY ARTERY;  Surgeon: Jacky Menard MD;  Location: [facility] CATH LAB;  Service: Cardiology;  Laterality: N/A;    LEFT HEART CATHETERIZATION Left 08/25/2022    Procedure: CATHETERIZATION, HEART, LEFT;  Surgeon: Jacky Menard MD;  Location: [facility] CATH LAB;  Service: Cardiology;  Laterality: Left;    PROSTATECTOMY      TONSILLECTOMY           Pre-op Assessment    I have reviewed the Patient Summary Reports.     I have reviewed the Nursing Notes. I have reviewed the NPO Status.   I have reviewed the Medications.     Review of Systems  Anesthesia Hx:  No problems with previous Anesthesia   History of prior surgery of interest to airway management or planning:  Previous anesthesia: General        Denies Family Hx of Anesthesia complications.    Denies Personal Hx of Anesthesia complications.                    Social:  Non-Smoker       Hematology/Oncology:  Hematology Normal                       --  Cancer in past history:                 Oncology Comments: Prostate CA.     Cardiovascular:  Cardiovascular Normal       CAD               Non-obstructing CAD, med tx.  Asymptomatic.                           Pulmonary:  Pulmonary Normal                       Renal/:  Renal/ Normal                 Hepatic/GI:  Hepatic/GI Normal                    Neurological:  Neurology Normal                                      Psych:  Psychiatric Normal                    Physical Exam  General: Well nourished    Airway:  Mallampati: II   Mouth Opening: Normal  TM Distance: Normal  Tongue: Normal  Neck ROM: Normal ROM    Dental:  Intact        Anesthesia Plan  Type of Anesthesia, risks & benefits  discussed:    Anesthesia Type: Gen Natural Airway, MAC  Intra-op Monitoring Plan: Standard ASA Monitors  Post Op Pain Control Plan: multimodal analgesia and IV/PO Opioids PRN  Induction:  IV  Informed Consent: Informed consent signed with the Patient and all parties understand the risks and agree with anesthesia plan.  All questions answered.   ASA Score: 2  Day of Surgery Review of History & Physical: H&P Update referred to the surgeon/provider.    Ready For Surgery From Anesthesia Perspective.     .

## 2025-02-27 ENCOUNTER — TELEPHONE (OUTPATIENT)
Dept: PREADMISSION TESTING | Facility: HOSPITAL | Age: 80
End: 2025-02-27
Payer: MEDICARE

## 2025-02-27 ENCOUNTER — RESULTS FOLLOW-UP (OUTPATIENT)
Dept: INTERNAL MEDICINE | Facility: CLINIC | Age: 80
End: 2025-02-27
Payer: MEDICARE

## 2025-02-27 ENCOUNTER — TELEPHONE (OUTPATIENT)
Dept: RESPIRATORY THERAPY | Facility: HOSPITAL | Age: 80
End: 2025-02-27
Payer: MEDICARE

## 2025-02-27 NOTE — TELEPHONE ENCOUNTER
Called and spoke with the Pt about the following:      Your Surgery arrival time is at 10:00 am on 2/28/25 at [facility].   The [place] is [address]. [place] [address].       *If you are running late or have questions the morning of surgery, please call the Pre-OP Department @ [phone].     Do not eat or drink after 12 midnight, including water. Do not smoke or use chewing tobacco after 12 midnight!  OK to brush teeth, but no gum, candy, or mints!      Additional Instructions:  You may be required to provide a urine sample prior to procedure;   Please ask  for a specimen cup if you need to use the restroom prior to being called into pre-op.     Please come to the main lobby and be prepared to show your photo ID and insurance card.       Only take specific medications discussed with the Pre-Admit Nurse.      Please call with any questions or concerns ([phone] or [phone])    [facility] Visitor/Ride Policy:   Adults:  Only 1 adult allowed (must be 18 or older) to accompany you and MUST STAY through the entire length of admission.     -Must have a ride home from a responsible adult that you know and trust.    -Medical Transport, Uber or Lyft can only be used if patient has a responsible adult to accompany them during ride home.    Pediatrics:  Pediatric patients are encouraged to have 2 adults (must be 18 or older) accompany them to the surgery center.   ~If the parent/legal guardian is unable to stay for the duration of the surgery time,   you MUST have someone over the age of 18 able to stay with the patient until time of discharge.     ~The parent/legal guardian must be available to be reached by phone at all times if they are unable to stay with the patient.

## 2025-02-27 NOTE — TELEPHONE ENCOUNTER
Reviewed lab results with patient.  Platelets noted at 132.  Patient does have a known history of thrombocytopenia per PCP note.  Discussed platelet count with surgeon, he is okay with proceeding as scheduled.

## 2025-02-28 ENCOUNTER — HOSPITAL ENCOUNTER (OUTPATIENT)
Facility: HOSPITAL | Age: 80
Discharge: HOME | End: 2025-02-28
Attending: ORTHOPAEDIC SURGERY | Admitting: ORTHOPAEDIC SURGERY
Payer: MEDICARE

## 2025-02-28 ENCOUNTER — ANESTHESIA (OUTPATIENT)
Dept: SURGERY | Facility: HOSPITAL | Age: 80
End: 2025-02-28
Payer: MEDICARE

## 2025-02-28 DIAGNOSIS — Z01.818 PRE-OP EXAM: ICD-10-CM

## 2025-02-28 DIAGNOSIS — G56.01 RIGHT CARPAL TUNNEL SYNDROME: Primary | ICD-10-CM

## 2025-02-28 LAB — POCT GLUCOSE: 129 MG/DL (ref 70–110)

## 2025-02-28 PROCEDURE — 71000015 HC POSTOP RECOV 1ST HR: Performed by: ORTHOPAEDIC SURGERY

## 2025-02-28 PROCEDURE — 37000009 HC ANESTHESIA EA ADD 15 MINS: Performed by: ORTHOPAEDIC SURGERY

## 2025-02-28 PROCEDURE — 63600175 PHARM REV CODE 636 W HCPCS: Performed by: NURSE ANESTHETIST, CERTIFIED REGISTERED

## 2025-02-28 PROCEDURE — 63600175 PHARM REV CODE 636 W HCPCS: Performed by: ORTHOPAEDIC SURGERY

## 2025-02-28 PROCEDURE — 37000008 HC ANESTHESIA 1ST 15 MINUTES: Performed by: ORTHOPAEDIC SURGERY

## 2025-02-28 PROCEDURE — 36000707: Performed by: ORTHOPAEDIC SURGERY

## 2025-02-28 PROCEDURE — 71000033 HC RECOVERY, INTIAL HOUR: Performed by: ORTHOPAEDIC SURGERY

## 2025-02-28 PROCEDURE — 64721 CARPAL TUNNEL SURGERY: CPT | Mod: RT,,, | Performed by: ORTHOPAEDIC SURGERY

## 2025-02-28 PROCEDURE — 36000706: Performed by: ORTHOPAEDIC SURGERY

## 2025-02-28 RX ORDER — CEFAZOLIN SODIUM 1 G/3ML
INJECTION, POWDER, FOR SOLUTION INTRAMUSCULAR; INTRAVENOUS
Status: DISCONTINUED | OUTPATIENT
Start: 2025-02-28 | End: 2025-02-28

## 2025-02-28 RX ORDER — LIDOCAINE HYDROCHLORIDE 10 MG/ML
INJECTION, SOLUTION EPIDURAL; INFILTRATION; INTRACAUDAL; PERINEURAL
Status: DISCONTINUED | OUTPATIENT
Start: 2025-02-28 | End: 2025-02-28 | Stop reason: HOSPADM

## 2025-02-28 RX ORDER — HYDROCODONE BITARTRATE AND ACETAMINOPHEN 5; 325 MG/1; MG/1
1 TABLET ORAL EVERY 4 HOURS PRN
Status: DISCONTINUED | OUTPATIENT
Start: 2025-02-28 | End: 2025-02-28 | Stop reason: HOSPADM

## 2025-02-28 RX ORDER — ONDANSETRON HYDROCHLORIDE 2 MG/ML
4 INJECTION, SOLUTION INTRAVENOUS DAILY PRN
Status: DISCONTINUED | OUTPATIENT
Start: 2025-02-28 | End: 2025-02-28 | Stop reason: HOSPADM

## 2025-02-28 RX ORDER — CHLORHEXIDINE GLUCONATE ORAL RINSE 1.2 MG/ML
10 SOLUTION DENTAL
OUTPATIENT
Start: 2025-02-28

## 2025-02-28 RX ORDER — FENTANYL CITRATE 50 UG/ML
25 INJECTION, SOLUTION INTRAMUSCULAR; INTRAVENOUS EVERY 5 MIN PRN
Status: DISCONTINUED | OUTPATIENT
Start: 2025-02-28 | End: 2025-02-28 | Stop reason: HOSPADM

## 2025-02-28 RX ORDER — BUPIVACAINE HYDROCHLORIDE 2.5 MG/ML
INJECTION, SOLUTION EPIDURAL; INFILTRATION; INTRACAUDAL
Status: DISCONTINUED
Start: 2025-02-28 | End: 2025-02-28 | Stop reason: HOSPADM

## 2025-02-28 RX ORDER — GLUCAGON 1 MG
1 KIT INJECTION
Status: DISCONTINUED | OUTPATIENT
Start: 2025-02-28 | End: 2025-02-28 | Stop reason: HOSPADM

## 2025-02-28 RX ORDER — OXYCODONE AND ACETAMINOPHEN 5; 325 MG/1; MG/1
1 TABLET ORAL
Status: DISCONTINUED | OUTPATIENT
Start: 2025-02-28 | End: 2025-02-28 | Stop reason: HOSPADM

## 2025-02-28 RX ORDER — HYDROCODONE BITARTRATE AND ACETAMINOPHEN 5; 325 MG/1; MG/1
1 TABLET ORAL EVERY 6 HOURS PRN
Qty: 5 TABLET | Refills: 0 | Status: SHIPPED | OUTPATIENT
Start: 2025-02-28 | End: 2025-03-07

## 2025-02-28 RX ORDER — LIDOCAINE HYDROCHLORIDE 20 MG/ML
INJECTION INTRAVENOUS
Status: DISCONTINUED | OUTPATIENT
Start: 2025-02-28 | End: 2025-02-28

## 2025-02-28 RX ORDER — PROPOFOL 10 MG/ML
INJECTION, EMULSION INTRAVENOUS
Status: DISCONTINUED | OUTPATIENT
Start: 2025-02-28 | End: 2025-02-28

## 2025-02-28 RX ORDER — CHLORHEXIDINE GLUCONATE ORAL RINSE 1.2 MG/ML
10 SOLUTION DENTAL 2 TIMES DAILY
Status: DISCONTINUED | OUTPATIENT
Start: 2025-02-28 | End: 2025-02-28 | Stop reason: HOSPADM

## 2025-02-28 RX ORDER — ONDANSETRON HYDROCHLORIDE 2 MG/ML
INJECTION, SOLUTION INTRAVENOUS
Status: DISCONTINUED | OUTPATIENT
Start: 2025-02-28 | End: 2025-02-28

## 2025-02-28 RX ORDER — BUPIVACAINE HYDROCHLORIDE 2.5 MG/ML
INJECTION, SOLUTION EPIDURAL; INFILTRATION; INTRACAUDAL
Status: DISCONTINUED | OUTPATIENT
Start: 2025-02-28 | End: 2025-02-28 | Stop reason: HOSPADM

## 2025-02-28 RX ORDER — FENTANYL CITRATE 50 UG/ML
INJECTION, SOLUTION INTRAMUSCULAR; INTRAVENOUS
Status: DISCONTINUED | OUTPATIENT
Start: 2025-02-28 | End: 2025-02-28

## 2025-02-28 RX ORDER — SODIUM CHLORIDE, SODIUM LACTATE, POTASSIUM CHLORIDE, CALCIUM CHLORIDE 600; 310; 30; 20 MG/100ML; MG/100ML; MG/100ML; MG/100ML
INJECTION, SOLUTION INTRAVENOUS CONTINUOUS PRN
Status: DISCONTINUED | OUTPATIENT
Start: 2025-02-28 | End: 2025-02-28

## 2025-02-28 RX ORDER — LIDOCAINE HYDROCHLORIDE 10 MG/ML
INJECTION, SOLUTION EPIDURAL; INFILTRATION; INTRACAUDAL; PERINEURAL
Status: DISCONTINUED
Start: 2025-02-28 | End: 2025-02-28 | Stop reason: HOSPADM

## 2025-02-28 RX ORDER — DEXAMETHASONE SODIUM PHOSPHATE 4 MG/ML
INJECTION, SOLUTION INTRA-ARTICULAR; INTRALESIONAL; INTRAMUSCULAR; INTRAVENOUS; SOFT TISSUE
Status: DISCONTINUED | OUTPATIENT
Start: 2025-02-28 | End: 2025-02-28

## 2025-02-28 RX ADMIN — ONDANSETRON 4 MG: 2 INJECTION INTRAMUSCULAR; INTRAVENOUS at 11:02

## 2025-02-28 RX ADMIN — FENTANYL CITRATE 50 MCG: 50 INJECTION, SOLUTION INTRAMUSCULAR; INTRAVENOUS at 10:02

## 2025-02-28 RX ADMIN — CEFAZOLIN 2 G: 330 INJECTION, POWDER, FOR SOLUTION INTRAMUSCULAR; INTRAVENOUS at 10:02

## 2025-02-28 RX ADMIN — PROPOFOL 10 MG: 10 INJECTION, EMULSION INTRAVENOUS at 10:02

## 2025-02-28 RX ADMIN — LIDOCAINE HYDROCHLORIDE 75 MG: 20 INJECTION INTRAVENOUS at 10:02

## 2025-02-28 RX ADMIN — PROPOFOL 50 MG: 10 INJECTION, EMULSION INTRAVENOUS at 10:02

## 2025-02-28 RX ADMIN — PROPOFOL 30 MG: 10 INJECTION, EMULSION INTRAVENOUS at 10:02

## 2025-02-28 RX ADMIN — SODIUM CHLORIDE, POTASSIUM CHLORIDE, SODIUM LACTATE AND CALCIUM CHLORIDE: 600; 310; 30; 20 INJECTION, SOLUTION INTRAVENOUS at 10:02

## 2025-02-28 RX ADMIN — DEXAMETHASONE SODIUM PHOSPHATE 4 MG: 4 INJECTION, SOLUTION INTRA-ARTICULAR; INTRALESIONAL; INTRAMUSCULAR; INTRAVENOUS; SOFT TISSUE at 10:02

## 2025-02-28 RX ADMIN — FENTANYL CITRATE 50 MCG: 50 INJECTION, SOLUTION INTRAMUSCULAR; INTRAVENOUS at 11:02

## 2025-02-28 NOTE — DISCHARGE SUMMARY
[facility]  Discharge Note  Short Stay    Procedure(s) (LRB):  Right carpal tunnel release (Right)      OUTCOME: Patient tolerated treatment/procedure well without complication and is now ready for discharge.    DISPOSITION: Home or Self Care    FINAL DIAGNOSIS:  <principal problem not specified>    FOLLOWUP: In clinic    DISCHARGE INSTRUCTIONS:    Discharge Procedure Orders   Ambulatory referral/consult to Pre-Admit   Standing Status: Future   Referral Priority: Routine Referral Type: Consultation   Referral Reason: Specialty Services Required   Requested Specialty: Internal Medicine   Number of Visits Requested: 1     Diet general     Keep surgical extremity elevated     Lifting restrictions     No driving, operating heavy equipment or signing legal documents while taking pain medication.     Call MD for:  temperature >100.4     Call MD for:  persistent nausea and vomiting     Call MD for:  severe uncontrolled pain     Call MD for:  difficulty breathing, headache or visual disturbances     Call MD for:  redness, tenderness, or signs of infection (pain, swelling, redness, odor or green/yellow discharge around incision site)     Call MD for:  hives     Call MD for:  persistent dizziness or light-headedness     Call MD for:  extreme fatigue     Remove dressing in 72 hours        TIME SPENT ON DISCHARGE: 5 minutes

## 2025-02-28 NOTE — TRANSFER OF CARE
Anesthesia Transfer of Care Note    Patient: Shahram London    Procedure(s) Performed: Procedure(s) (LRB):  Right carpal tunnel release (Right)    Patient location: PACU    Anesthesia Type: MAC    Transport from OR: Transported from OR on room air with adequate spontaneous ventilation    Post pain: adequate analgesia    Post assessment: no apparent anesthetic complications and tolerated procedure well    Post vital signs: stable    Level of consciousness: awake    Nausea/Vomiting: no nausea/vomiting    Complications: none    Transfer of care protocol was followed      Last vitals: Visit Vitals  BP (!) 149/71 (BP Location: Right arm, Patient Position: Sitting)   Pulse 64   Temp 36.4 °C (97.5 °F)   Resp 18   Ht 6' (1.829 m)   Wt 95.7 kg (210 lb 13.9 oz)   SpO2 98%   BMI 28.60 kg/m²

## 2025-02-28 NOTE — OP NOTE
CIRA Hill M.D.  Orthopaedic Hand and Wrist Surgery  [facility]    OPERATIVE REPORT    Procedure Date: 2/28/2025     Patient Name: Shahram London     YOB: 1945    Pre-Operative Diagnosis:  right Carpal Tunnel Syndrome    Post-Operative Diagnosis:  right Carpal Tunnel Syndrome    Procedure Performed:  right Carpal Tunnel Release    Surgeon: CIRA Hill MD    Assistant: None    Anesthesia: Monitor Anesthesia Care    Fluids: See Anesthesia Record    Urine Output: See Anesthesia Record    Estimated Blood Loss: * No values recorded between 2/28/2025 10:41 AM and 2/28/2025 11:17 AM *    Implants: * No implants in log *    Specimens:   Specimen (24h ago, onward)      None             Drains: None    Tourniquet Time:   Total Tourniquet Time Documented:  Arm  (Right) - 16 minutes  Total: Arm  (Right) - 16 minutes       Complications: None    Fluoro/C-arm utilized during the case: No    Loupes/Microscope: 3.5x Loupe magnification was utilized for this case    Indications for Procedure and Brief History:  Shahram London is a 80 y.o. male with R CTS    I had a long discussion with the patient about treatment and diagnostic options. We discussed operative and non-operative options and expected outcomes of their diagnosis and co-morbidities. We discussed the risks and benefits of surgery at length, including but not limited to pain, infection, bleeding, damage to adjacent structures such as nerves and blood vessels, failure of appropriate healing, stiffness, scarring, laxity, need for more surgery, stroke, blood clot, loss of life, loss of limb, need for removal of any implants used, anesthesia risks, breathing problems, and heart problems. We talked about common and uncommon risks. We discussed risks that were higher specific to the patient and their co-morbid conditions.  We discussed expected treatment outcomes in regards to pain, function and the possibility of  permanent impairment.  They expressed understanding of the risks and benefits an opted to proceed with surgical management.  The patient was consented and marked prior to transfer to the operating room.    Intra-Operative Findings: There there was complete release of the transverse carpal ligament from the distal palmar fat to the antebrachial fascia there was no trans ligamentous branch of the median nerve and no persistent median artery.  The median nerve proper was stenotic and hyperemic at the level of the transverse carpal ligament.    Description of Procedure: I met the patient in the preoperative holding area. I identified, confirmed, and marked the operative extremity. All questions were answered. The patient was then taken back to the operating room and transferred to the operative table. The patient was placed in the supine position. All bony prominences were padded. The operative extremity was then prepped and draped in the standard sterile fashion with alcohol and chlorhexidine solutions. A timeout was performed to ensure we had the proper patient, proper operative site, and were performing the proper procedure. All members of the operative team were in agreement with this.     Our attention was first turned to the peripheral nerve block. A 25 gauge needle was used to inject a solution of 1% xylocaine and 0.5% Marcaine in the area of the palmar cutaneous branch of the median nerve, as well as in the area of the proposed incision between the thenar and hypothenar eminences. After adequate anesthesia, the extremity was exsanguinated with elevation followed by inflation of a forearm tourniquet to 250mmHg. A #15 blade was then used to make an incision starting 5 mm distal to the wrist flexion crease and extending distally for approximately 2.5 cm, in line with the ulnar border of the 4thray. Blunt dissection was carried through the subcutaneous tissue. Hemostasis was maintained with bipolar cautery. The  palmar fascia was identified and split, in line with its fibers. With the use of Estuardo and Ragnell retractors we were able to visualize the transverse carpal ligament in its entirety. After ensuring there was no transligamentous branch of the median nerve in the area of the incision, a #15 blade was used to divide the transverse carpal ligament along its ulnar border, starting at the level of the hook of the hamate. The ligament was divided distally under direct visualization all the way out to the distal palmar fat, protecting the superficial arch. After the distal palmar fat was encountered our attention was then turned proximally. Again, under direct visualization, the proximal portion of the transverse carpal ligament was divided. At the proximal most aspect of the incision, a hemostat was utilized to develop a plane, both dorsal and volar to the forearm fascia, and under direct visualization, the distal aspect of the forearm fascia was divided. The median nerve was inspected and freed up from the radial and ulnar leaflets. There was stenosis of the median nerve underneath the transverse carpal ligament and hyperemia of the nerve. Hemostasis was maintained with bipolar cautery. The wound was copiously irrigated and then closed with 4-0 Prolene. Dressings included xeroform, 4x4s, and a loosely wrapped soft dressing. After dressing application, the fingers had good, brisk capillary refill.     All sponge, instrument, and needle counts were correct at the conclusion of the case.      Condition: Good    Disposition: PACU - hemodynamically stable.    Attestation: I was present and scrubbed for the entire procedure.    Post-Operative Exam:  The patient was examined post-operatively and the limb was warm and well perfused with appropriate neurovascular status relative to preoperative block status. The dressing was intact.      Post-Operative Plan:  Weight bearing: NWB  Range of motion: Encouraged  Dressing Change: 72  hours  Keep wound clean and dry  Antibiotics: none  DVT Ppx: ambulation  PT/OT: none  Follow-up: 10-12 days  Discharge Plan: Today    CIRA Hill MD  Orthopaedic Hand and Wrist Surgery

## 2025-02-28 NOTE — INTERVAL H&P NOTE
The patient has been examined and the H&P has been reviewed:    I concur with the findings and no changes have occurred since H&P was written.    Surgery risks, benefits and alternative options discussed and understood by patient/family.      Patient has severe CTS with atrophy, weakness and constant numbness    There are no hospital problems to display for this patient.

## 2025-02-28 NOTE — ANESTHESIA POSTPROCEDURE EVALUATION
Anesthesia Post Evaluation    Patient: Shahram London    Procedure(s) Performed: Procedure(s) (LRB):  Right carpal tunnel release (Right)    Final Anesthesia Type: MAC      Patient location during evaluation: PACU  Patient participation: Yes- Able to Participate  Level of consciousness: awake  Post-procedure vital signs: reviewed and stable  Pain management: adequate  Airway patency: patent    PONV status at discharge: No PONV  Anesthetic complications: no      Cardiovascular status: stable  Respiratory status: unassisted  Hydration status: euvolemic  Follow-up not needed.              Vitals Value Taken Time   /67 02/28/25 11:42   Temp 36.5 °C (97.7 °F) 02/28/25 11:19   Pulse 56 02/28/25 11:44   Resp 21 02/28/25 11:44   SpO2 93 % 02/28/25 11:44   Vitals shown include unfiled device data.      No case tracking events are documented in the log.      Pain/Philly Score: Philly Score: 10 (2/28/2025 11:34 AM)

## 2025-02-28 NOTE — DISCHARGE INSTRUCTIONS
Discharge Instructions     Patient Name: Shahram London  Procedure: Procedure(s) (LRB):  Right carpal tunnel release (Right)     Surgeon: CIRA Hill M.D.     Date of Surgery: 2/28/2025      Wound Care: Keep incision clean and dry until follow up.  May remove dressing in 72 hours and replace with band-aid.   Do not get wound wet!  Weight Bearing Status: Non-weight bearing to the operative extremity.  Activity: Please keep your operative arm elevated.  Please flex and extend your exposed fingers several times per hour.  This will help reduce swelling at the operative site. If the fingers are getting stiff move them more often.  Medication: Take these medications as directed. You should take pain medications with food.    Current Discharge Medication List        START taking these medications    Details   HYDROcodone-acetaminophen (NORCO) 5-325 mg per tablet Take 1 tablet by mouth every 6 (six) hours as needed for Pain.  Qty: 5 tablet, Refills: 0    Comments: Quantity prescribed more than 7 day supply? No  Associated Diagnoses: Right carpal tunnel syndrome             While on opioid (narcotic) pain medication, please refrain from:  Driving  Operating Heavy Machinery/Power Tools  Drinking alcohol  All narcotics can cause some degree of itching, sedation, constipation and nausea. You should take stool softeners to prevent constipation. These can be purchased over the counter.   Prescription refill requests are only accepted during normal business hours (Monday-Friday 8am-4pm) and may take up to 48 hours to fill.     If you have any of the following signs or symptoms please proceed to your nearest Emergency Room:   Chest Pain  Shortness of Breath/ Difficulty Breathing  Excessive Bleeding    Please call the office if you have the following:   Excessive swelling that doesn't improve with elevation  Foul smelling odor from your wounds or dressings  Discharge from your surgical wounds  Persistent pain that  does not get better with the prescription pain medication & elevation  Persistent nausea and/or vomiting  Fevers greater than 101.1 after the first 48 hours post op  Dramatic increase in post-operative pain    CIRA Hill M.D.  [facility]  Orthopedic Hand and Wrist Surgeon    [facility]  Dr. Reji Hill   [employer]   [employer]   [employer]

## 2025-03-03 VITALS
HEART RATE: 58 BPM | SYSTOLIC BLOOD PRESSURE: 127 MMHG | OXYGEN SATURATION: 99 % | TEMPERATURE: 98 F | BODY MASS INDEX: 28.56 KG/M2 | RESPIRATION RATE: 15 BRPM | DIASTOLIC BLOOD PRESSURE: 76 MMHG | WEIGHT: 210.88 LBS | HEIGHT: 72 IN

## 2025-03-12 ENCOUNTER — OFFICE VISIT (OUTPATIENT)
Dept: ORTHOPEDICS | Facility: CLINIC | Age: 80
End: 2025-03-12
Payer: MEDICARE

## 2025-03-12 DIAGNOSIS — Z98.890 POST-OPERATIVE STATE: Primary | ICD-10-CM

## 2025-03-12 PROCEDURE — 99999 PR PBB SHADOW E&M-EST. PATIENT-LVL II: CPT | Mod: PBBFAC,,, | Performed by: ORTHOPAEDIC SURGERY

## 2025-03-12 NOTE — PROGRESS NOTES
CIRA Hill M.D.  Orthopaedic Hand and Wrist Surgery  [address]    Patient ID: Shahram London  YOB: 1945  MRN: [id number]    Date of Surgery:  2025    Procedure Performed:   Right carpal tunnel release - Right    History of Present Illness: Shahram London is a 80 y.o. male status post right carpal tunnel release he reports his nighttime numbness pain and tingling has totally resolved he still has his baseline constant numbness bilaterally but his nighttime burning tingling pain that did wake him up almost every night on both sides has resolved totally.    Patient was queried and this is the extent of the patients current complaints today.    Physical Exam:   Wound:  Both wounds have healed well sutures on the right were taken out today  Sensation:  Baseline decreased sensation as expected over the median nerve distribution  Motor:  4/5 abductor pollicis brevis bilaterally 5/5 1st dorsal interosseous  Swelling:  As expected  Full range of motion of the hand    Imaging:   None    Provider Note/Medical Decision Makin. Post-operative state         Do not submerge or soak wound for 2 weeks.  Showers, washing hands and running water is okay.  No heavy lifting, pushing or pulling.  Start desensitization exercises.  Patient understands the risk of recurrence and incomplete relief of symptoms.  Restrictions stated above in place until 2 weeks.  RTC p.r.n.    I discussed worrisome and red flag signs and symptoms with the patient. The patient expressed understanding and agreed to alert me immediately or to go to the emergency room if they experience any of these.   Treatment plan was developed with input from the patient/family, and they expressed understanding and agreement with the plan. All questions were answered today.    There are no Patient Instructions on file for this visit.    CIRA Hill M.D.  [facility]  Orthopedic  Hand and Wrist Surgeon    [facility]  Dr. Reji Hill   [employer]   [employer]   [employer]     Disclaimer: This note was prepared using a voice recognition system and is likely to have sound alike errors within the text.

## (undated) DEVICE — SPONGE COTTON TRAY 4X4IN

## (undated) DEVICE — ANGIOTOUCH KIT

## (undated) DEVICE — CATH OPTITORQUE TIG 4.0 100 CM

## (undated) DEVICE — BANDAGE ELASTIC 2X5 VELCRO ST

## (undated) DEVICE — SUT PROLENE 4-0 MONO 18IN

## (undated) DEVICE — SYR 10CC LUER LOCK

## (undated) DEVICE — GLOVE SURG ULTRA TOUCH 7.5

## (undated) DEVICE — GOWN POLY REINF BRTH SLV XL

## (undated) DEVICE — DRESSING XEROFORM NONADH 1X8IN

## (undated) DEVICE — NDL HYPO ECLIP 27GX1/2 LL SAF

## (undated) DEVICE — CATH JR4 5FR

## (undated) DEVICE — PACK BASIC SETUP SC BR

## (undated) DEVICE — TOWEL OR DISP STRL BLUE 4/PK

## (undated) DEVICE — KIT MANIFOLD LOW PRESS TUBING

## (undated) DEVICE — COVER CAMERA OPERATING ROOM

## (undated) DEVICE — NDL ECLIPSE SAF REG 25GX1.5IN

## (undated) DEVICE — NDL BLUNT W/O FILTER 18GX1.5IN

## (undated) DEVICE — COVER LIGHT HANDLE 80/CA

## (undated) DEVICE — SCRUB HIBICLENS 4% CHG 4OZ

## (undated) DEVICE — UNDERGLOVES BIOGEL PI SIZE 7.5

## (undated) DEVICE — CATH INFINITI 4F 3DRC 100CM

## (undated) DEVICE — POSITIONER HEAD DONUT 9IN FOAM

## (undated) DEVICE — SUPPORT ULNA NERVE PROTECTOR

## (undated) DEVICE — ALCOHOL 70% ISOP RUBBING 4OZ

## (undated) DEVICE — CONTRAST VISIPAQUE 150ML

## (undated) DEVICE — APPLICATOR CHLORAPREP ORN 26ML

## (undated) DEVICE — BAND TR COMP DEVICE REG 24CM

## (undated) DEVICE — DRAPE THREE-QTR REINF 53X77IN

## (undated) DEVICE — CORD BIPOLAR ELECTROSURGICAL

## (undated) DEVICE — GAUZE CNFRM STRL 2INX4.1YD

## (undated) DEVICE — KIT GLIDESHEATH SLEND 6FR 10CM

## (undated) DEVICE — KIT TURNOVER

## (undated) DEVICE — KIT SYR REUSABLE

## (undated) DEVICE — SYR LUER LOCK STERILE 10ML

## (undated) DEVICE — DRAPE HAND STERILE

## (undated) DEVICE — KIT SITE-RITE NDL GUIDE 21G

## (undated) DEVICE — PACK CATH LAB CUSTOM BR

## (undated) DEVICE — BANDAGE ESMARK ELASTIC ST 4X9

## (undated) DEVICE — GUIDEWIRE EMERALD .035IN 260CM

## (undated) DEVICE — SEE MEDLINE ITEM 157187

## (undated) DEVICE — BANDAGE MATRIX HK LOOP 2IN 5YD

## (undated) DEVICE — TOURNIQUET SB QC DP 18X4IN

## (undated) DEVICE — SOL 9P NACL IRR PIC IL